# Patient Record
Sex: FEMALE | Race: WHITE | NOT HISPANIC OR LATINO | Employment: OTHER | URBAN - NONMETROPOLITAN AREA
[De-identification: names, ages, dates, MRNs, and addresses within clinical notes are randomized per-mention and may not be internally consistent; named-entity substitution may affect disease eponyms.]

---

## 2017-03-17 ENCOUNTER — TRANSCRIBE ORDERS (OUTPATIENT)
Dept: FAMILY MEDICINE CLINIC | Facility: CLINIC | Age: 67
End: 2017-03-17

## 2017-03-17 DIAGNOSIS — Z12.31 VISIT FOR SCREENING MAMMOGRAM: Primary | ICD-10-CM

## 2017-03-27 ENCOUNTER — HOSPITAL ENCOUNTER (OUTPATIENT)
Dept: MAMMOGRAPHY | Facility: HOSPITAL | Age: 67
Discharge: HOME | End: 2017-03-27
Admitting: INTERNAL MEDICINE

## 2017-03-27 DIAGNOSIS — Z12.31 VISIT FOR SCREENING MAMMOGRAM: ICD-10-CM

## 2017-03-27 PROCEDURE — G0202 SCR MAMMO BI INCL CAD: HCPCS | Performed by: RADIOLOGY

## 2017-03-27 PROCEDURE — G0202 SCR MAMMO BI INCL CAD: HCPCS

## 2017-03-27 PROCEDURE — 77063 BREAST TOMOSYNTHESIS BI: CPT

## 2017-03-27 PROCEDURE — 77063 BREAST TOMOSYNTHESIS BI: CPT | Performed by: RADIOLOGY

## 2017-03-31 ENCOUNTER — TRANSCRIBE ORDERS (OUTPATIENT)
Dept: GENERAL RADIOLOGY | Facility: HOSPITAL | Age: 67
End: 2017-03-31

## 2017-03-31 DIAGNOSIS — Z78.0 MENOPAUSE: Primary | ICD-10-CM

## 2017-04-05 ENCOUNTER — APPOINTMENT (OUTPATIENT)
Dept: BONE DENSITY | Facility: HOSPITAL | Age: 67
End: 2017-04-05

## 2017-04-05 DIAGNOSIS — Z78.0 MENOPAUSE: ICD-10-CM

## 2017-04-05 PROCEDURE — 77080 DXA BONE DENSITY AXIAL: CPT

## 2017-05-12 RX ORDER — MELOXICAM 7.5 MG/1
TABLET ORAL
Qty: 30 TABLET | Refills: 5 | OUTPATIENT
Start: 2017-05-12

## 2017-06-21 ENCOUNTER — CONSULT (OUTPATIENT)
Dept: CARDIOLOGY | Facility: CLINIC | Age: 67
End: 2017-06-21

## 2017-06-21 VITALS
SYSTOLIC BLOOD PRESSURE: 104 MMHG | WEIGHT: 214.8 LBS | BODY MASS INDEX: 35.79 KG/M2 | HEART RATE: 78 BPM | HEIGHT: 65 IN | DIASTOLIC BLOOD PRESSURE: 76 MMHG

## 2017-06-21 DIAGNOSIS — I47.1 ATRIAL TACHYCARDIA (HCC): Primary | ICD-10-CM

## 2017-06-21 PROBLEM — I47.19 ATRIAL TACHYCARDIA: Status: ACTIVE | Noted: 2017-06-21

## 2017-06-21 PROCEDURE — 93000 ELECTROCARDIOGRAM COMPLETE: CPT | Performed by: INTERNAL MEDICINE

## 2017-06-21 PROCEDURE — 99202 OFFICE O/P NEW SF 15 MIN: CPT | Performed by: INTERNAL MEDICINE

## 2017-06-21 NOTE — PROGRESS NOTES
Cardiology Consult     Tracy Barrios  1950  [phone]      06/21/17    DATE OF ADMISSION: (Not on file)  [facility] CARDIOLOGY    Zohra Gonzales MD  [address] [place] [id number]    Chief Complaint   Patient presents with   • Palpitations     Problem List:  1. SVT   A. Echocardiogram 2012: normal EF, mild MR/TR   B. EPS I RFA of atrial tachycardia, arising from the coronary sinus 3/29/13  2. HTN  3. HLP  4. DM  5. GERD  6. HH  7. Migraine.  8. Arthritis  9. Surgical History   A. Appendectomy   B. Breast reduction   C. Hysterectomy   D. Tonsillectomy   E. Bladder surgery      History of Present Illness:   66 year old WF who is referred to our care today by Dr. Gonzales for palpitations. She is a previous patient of Dr. Caruso and was last seen in 2013 after an atrial tachycardia ablation. She did well after the ablation for many years, however, about 4 months ago, she started experiencing palpitations. She thinks this was related to a muscle relaxer she was taking at that time for back pain. She described this as tachycardia with fatigue. She really just had one bad episode on a Sunday before [employer]. It subsided on its own after about 30 minutes. She would have to lay down. Now, she is off the medication and is not having the symptoms anymore. She does occasionally have palpitations if she climbs stairs that lasts about 5 minutes but this is not bothersome to her. She thinks she is doing fine now. She feels well overall, and denies CP, SOB, syncope, recent hospitalizations. No tobacco, no ETOH, no excessive caffeine.     Allergies   Allergen Reactions   • Metoprolol      Hair loss   • Statins      muscle pain   • Penicillins Rash        Cannot display prior to admission medications because the patient has not been admitted in this contact.            Current Outpatient Prescriptions:   •  baclofen (LIORESAL) 10 MG tablet, Take 1/2 - 1 tablet by mouth 3  (three) times a day as needed for spasm, Disp: 90 tablet, Rfl: 3  •  Cholecalciferol (VITAMIN D3) 5000 UNITS capsule capsule, Take 5,000 Units by mouth 1 (One) Time Per Week., Disp: , Rfl:   •  hydrochlorothiazide (MICROZIDE) 12.5 MG capsule, Take 1 capsule by mouth once daily, Disp: 90 capsule, Rfl: 1  •  lisinopril (PRINIVIL,ZESTRIL) 5 MG tablet, Take 1 tablet by mouth once daily for blood pressure, Disp: 90 tablet, Rfl: 1  •  meloxicam (MOBIC) 15 MG tablet, Take 1 tablet by mouth Every Morning. (Patient taking differently: Take 15 mg by mouth As Needed.), Disp: 30 tablet, Rfl: 3  •  metFORMIN (FORTAMET) 500 MG (OSM) 24 hr tablet, Take  1 tablet by mouth once daily, Disp: 30 tablet, Rfl: 5  •  pantoprazole (PROTONIX) 40 MG EC tablet, Take 1 tablet by mouth once daily (Patient taking differently: Take 20 mg by mouth.), Disp: 90 tablet, Rfl: 3    Social History     Social History   • Marital status:      Spouse name: N/A   • Number of children: N/A   • Years of education: N/A     Social History Main Topics   • Smoking status: Never Smoker   • Smokeless tobacco: Never Used   • Alcohol use No   • Drug use: No   • Sexual activity: Defer     Other Topics Concern   • None     Social History Narrative       Family History   Problem Relation Age of Onset   • Breast cancer Mother 50   • Cancer Mother      breast   • Hypertension Mother    • Heart attack Father    • Coronary artery disease Father    • Cancer Maternal Grandmother      Ovarian       REVIEW OF SYSTEMS:   CONST:  No weight loss, fever, chills, weakness or fatigue.   HEENT:  No visual loss, blurred vision, double vision, yellow sclerae.                   No hearing loss, congestion, sore throat.   SKIN:      No rashes, urticaria, ulcers, sores.     RESP:     No shortness of breath, hemoptysis, cough, sputum.   GI:           No anorexia, nausea, vomiting, diarrhea. No abdominal pain, melena.   :         No burning on urination, hematuria or increased  "frequency.  ENDO:    No diaphoresis, cold or heat intolerance. No polyuria or polydipsia.   NEURO:  No headache, dizziness, syncope, paralysis, ataxia, or parasthesias.                  No change in bowel or bladder control. No history of CVA/TIA  MUSC:    No muscle, back pain, joint pain or stiffness.   HEME:    No anemia, bleeding, bruising. No history of DVT/PE.  PSYCH:  No history of depression, anxiety    Vitals:    06/21/17 0901   BP: 104/76   BP Location: Right arm   Patient Position: Sitting   Pulse: 78   Weight: 214 lb 12.8 oz (97.4 kg)   Height: 65\" (165.1 cm)       Physical Exam:  GEN: Well nourished, Well- developed  No acute distress  HEENT: Normocephalic, Atraumatic, PERRLA, moist mucous membranes  NECK: supple, NO JVD, no thyromegaly, no lymphadenopathy  CARD: S1S2  RRR no murmur, gallop, rub  LUNGS: Clear to auscultataion, normal respiratory effort  ABDOMEN: Soft, nontender, normal bowel sounds  EXTREMITIES:No gross deformities,  No clubbing, cyanosis, or edema  SKIN: Warm, dry  NEURO: No focal deficits  PSYCHIATRIC: Normal affect and mood      Data:                                          ECG 12 Lead  Date/Time: 6/21/2017 9:36 AM  Performed by: LUBNA CARUSO  Authorized by: LUBNA CARUSO   Rhythm: sinus rhythm  BPM: 78                Assessment and Plan:   1. Atrial Tachycardia s/p ablation 4 years ago, doing well. She recently had some palpitations seem to be triggered by a medication. Now resolved. No further work up needed at this time. We will be happy to see her anytime she has problems    Follow up prn    Scribed for Lubna Caruso MD by Davina Churchill PA-C. 6/21/2017  9:37 AM     ILubna MD, personally performed the services face to face as described in this documentation and as scribed by the above named individual in my presence, and it is both accurate and complete.  6/21/2017  9:37 AM              "

## 2018-08-30 ENCOUNTER — TRANSCRIBE ORDERS (OUTPATIENT)
Dept: ADMINISTRATIVE | Facility: HOSPITAL | Age: 68
End: 2018-08-30

## 2018-08-30 DIAGNOSIS — Z12.31 VISIT FOR SCREENING MAMMOGRAM: Primary | ICD-10-CM

## 2018-10-03 ENCOUNTER — HOSPITAL ENCOUNTER (OUTPATIENT)
Dept: MAMMOGRAPHY | Facility: HOSPITAL | Age: 68
Discharge: HOME | End: 2018-10-03
Admitting: INTERNAL MEDICINE

## 2018-10-03 DIAGNOSIS — Z12.31 VISIT FOR SCREENING MAMMOGRAM: ICD-10-CM

## 2018-10-03 PROCEDURE — 77067 SCR MAMMO BI INCL CAD: CPT

## 2018-10-03 PROCEDURE — 77063 BREAST TOMOSYNTHESIS BI: CPT

## 2018-10-03 PROCEDURE — 77067 SCR MAMMO BI INCL CAD: CPT | Performed by: RADIOLOGY

## 2018-10-03 PROCEDURE — 77063 BREAST TOMOSYNTHESIS BI: CPT | Performed by: RADIOLOGY

## 2020-08-13 ENCOUNTER — OFFICE VISIT (OUTPATIENT)
Dept: ORTHOPEDIC SURGERY | Facility: CLINIC | Age: 70
End: 2020-08-13

## 2020-08-13 VITALS — WEIGHT: 214 LBS | HEIGHT: 65 IN | RESPIRATION RATE: 18 BRPM | BODY MASS INDEX: 35.65 KG/M2

## 2020-08-13 DIAGNOSIS — M23.91 INTERNAL DERANGEMENT OF KNEE JOINT, RIGHT: ICD-10-CM

## 2020-08-13 DIAGNOSIS — M17.10 ARTHRITIS OF KNEE: ICD-10-CM

## 2020-08-13 DIAGNOSIS — M25.562 ARTHRALGIA OF BOTH KNEES: Primary | ICD-10-CM

## 2020-08-13 DIAGNOSIS — M25.561 ARTHRALGIA OF BOTH KNEES: Primary | ICD-10-CM

## 2020-08-13 PROCEDURE — 99204 OFFICE O/P NEW MOD 45 MIN: CPT | Performed by: ORTHOPAEDIC SURGERY

## 2020-08-13 NOTE — PROGRESS NOTES
Subjective   Patient ID: Tracy Barrios is a 70 y.o. female  Pain of the Left Knee (Patient is here today for bilateral knee pain, she states her right knee is more painful than the left. Patient denies any injury or trauma. About a month ago she was camping and climbing in and out of the RV the knee became painful.) and Pain of the Right Knee             History of Present Illness  70-year-old female with history of chronic osteoarthritis of her left knee had more than 4 to 6 weeks of pain and cramping in her right knee after climbing another RV while camping.  Feels tight swollen stiff painful to bear weight most the pain is lateral posteriorly, she is a relative of told her she probably has a torn meniscus.  She saw her PCP by video conference and was referred here for further evaluation.  Denies hip pain back pain paresthesias swelling in the ankle or other neurovascular complaints.  Gives no history of prior injections or surgery to the right knee.  Has managed her left knee arthritis with meloxicam for years and is under control.  She was treated with a short course of prednisone p.o. for her right knee pain which helped very little.      Review of Systems   Constitutional: Negative for fever.   HENT: Negative for dental problem and voice change.    Eyes: Negative for visual disturbance.   Respiratory: Negative for shortness of breath.    Cardiovascular: Negative for chest pain.   Gastrointestinal: Negative for abdominal pain.   Genitourinary: Negative for dysuria.   Musculoskeletal: Positive for arthralgias. Negative for gait problem and joint swelling.   Skin: Negative for rash.   Neurological: Negative for speech difficulty.   Hematological: Does not bruise/bleed easily.   Psychiatric/Behavioral: Negative for confusion.       Past Medical History:   Diagnosis Date   • Arrhythmia    • Arthritis    • Atrial fibrillation (CMS/HCC)    • Diabetes mellitus (CMS/HCC)    • Dyslipidemia    • GERD (gastroesophageal  reflux disease)     GERD/ Hiatal Hernia   • Hyperlipidemia    • Hypertension    • Migraine     Migraine headaches   • SVT (supraventricular tachycardia) (CMS/HCC)         Past Surgical History:   Procedure Laterality Date   • ABLATION OF DYSRHYTHMIC FOCUS     • APPENDECTOMY     • BLADDER SURGERY     • BREAST BIOPSY Right 1990    Ultrasound guided   • CARDIAC CATHETERIZATION     • HYSTERECTOMY  1991    Total w/BSO   • OOPHORECTOMY Bilateral 1991   • REDUCTION MAMMAPLASTY Bilateral 1992   • TONSILLECTOMY         Family History   Problem Relation Age of Onset   • Breast cancer Mother 50   • Cancer Mother         breast   • Hypertension Mother    • Heart attack Father    • Coronary artery disease Father    • Cancer Maternal Grandmother         Ovarian       Social History     Socioeconomic History   • Marital status:      Spouse name: Not on file   • Number of children: Not on file   • Years of education: Not on file   • Highest education level: Not on file   Tobacco Use   • Smoking status: Never Smoker   • Smokeless tobacco: Never Used   Substance and Sexual Activity   • Alcohol use: No   • Drug use: No   • Sexual activity: Defer       I have reviewed the medical and surgical history, family history, social history, medications, and/or allergies, and the review of systems of this report.    Allergies   Allergen Reactions   • Metoprolol      Hair loss   • Statins      muscle pain   • Levofloxacin Rash   • Penicillins Rash         Current Outpatient Medications:   •  amoxicillin (AMOXIL) 500 MG capsule, Take 1 capsule by mouth 3 times daily until gone, Disp: 30 capsule, Rfl: 0  •  azithromycin (ZITHROMAX) 250 MG tablet, Take 2 tablets the first day, then 1 tablet daily for 4 days., Disp: 6 tablet, Rfl: 0  •  baclofen (LIORESAL) 10 MG tablet, Take 1/2 - 1 tablet by mouth 3 (three) times a day as needed for spasm, Disp: 90 tablet, Rfl: 3  •  baclofen (LIORESAL) 10 MG tablet, Take 1/2 to 1 tablet by mouth 3 (three)  times a day as needed for spasms, Disp: 90 tablet, Rfl: 3  •  baclofen (LIORESAL) 10 MG tablet, Take 1/2 to 1 tablet by mouth 3 times daily as needed for spasms, Disp: 90 tablet, Rfl: 3  •  baclofen (LIORESAL) 10 MG tablet, Take 1 tablet by mouth 3 (Three) Times a Day As Needed for muscle spasm, Disp: 90 tablet, Rfl: 3  •  benzonatate (TESSALON) 200 MG capsule, Take 1 capsule by mouth 3 (Three) Times a Day for 5 days, Disp: 15 capsule, Rfl: 0  •  Cholecalciferol (VITAMIN D3) 5000 UNITS capsule capsule, Take 5,000 Units by mouth 1 (One) Time Per Week., Disp: , Rfl:   •  clarithromycin (BIAXIN) 500 MG tablet, Take 1 tablet by mouth Every 12 (Twelve) Hours., Disp: 20 tablet, Rfl: 0  •  clindamycin (CLEOCIN) 300 MG capsule, Take 1 capsule by mouth Every 8 (Eight) Hours fo 10 days, Disp: 30 capsule, Rfl: 0  •  colesevelam (WELCHOL) 625 MG tablet, Take 1 tablet by mouth twice daily, try to gradually work up to 3 tablets twice daily, increasing dose weekly by 1 pill, Disp: 180 tablet, Rfl: 6  •  fenofibrate 160 MG tablet, Take one tablet by mouth at bedtime, Disp: 90 tablet, Rfl: 1  •  fenofibrate 160 MG tablet, TAKE 1 TABLET BY MOUTH ONCE A DAY FOR HIGH TRIGLYCERIDES, Disp: 30 tablet, Rfl: 6  •  gabapentin (NEURONTIN) 100 MG capsule, Take 1 capsule by mouth daily at bedtime with food, may increase after 3 days to 2 capsules at bedtime, Disp: 60 capsule, Rfl: 3  •  gabapentin (NEURONTIN) 300 MG capsule, Take 2 capsules by mouth every night at bedtime., Disp: 60 capsule, Rfl: 2  •  hydrochlorothiazide (MICROZIDE) 12.5 MG capsule, Take 1 capsule by mouth once daily, Disp: 90 capsule, Rfl: 1  •  hydrochlorothiazide (MICROZIDE) 12.5 MG capsule, Take 1 capsule by mouth once daily, Disp: 90 capsule, Rfl: 1  •  hydrochlorothiazide (MICROZIDE) 12.5 MG capsule, Take 1 capsule by mouth once daily, Disp: 90 capsule, Rfl: 1  •  hydrochlorothiazide (MICROZIDE) 12.5 MG capsule, Take 1 capsule by mouth once daily, Disp: 90 capsule, Rfl:  3  •  hydroCHLOROthiazide (MICROZIDE) 12.5 MG capsule, Take 1 capsule by mouth Daily., Disp: 90 capsule, Rfl: 3  •  hydroCHLOROthiazide (MICROZIDE) 12.5 MG capsule, Take 1 capsule by mouth Daily., Disp: 90 capsule, Rfl: 3  •  lisinopril (PRINIVIL,ZESTRIL) 2.5 MG tablet, Take 1 tablet by mouth Daily for blood pressure, Disp: 30 tablet, Rfl: 6  •  lisinopril (PRINIVIL,ZESTRIL) 2.5 MG tablet, TAKE 1 TABLET BY MOUTH EVERY DAY FOR BLOOD PRESSURE, Disp: 90 tablet, Rfl: 3  •  lisinopril (PRINIVIL,ZESTRIL) 2.5 MG tablet, Take 1 tablet by mouth Daily for blood pressure, Disp: 90 tablet, Rfl: 3  •  lisinopril (PRINIVIL,ZESTRIL) 2.5 MG tablet, Take 1 tablet by mouth Daily for blood pressure, Disp: 90 tablet, Rfl: 3  •  lisinopril (PRINIVIL,ZESTRIL) 5 MG tablet, Take 1 tablet by mouth once daily for blood pressure, Disp: 90 tablet, Rfl: 1  •  meloxicam (MOBIC) 15 MG tablet, Take 1 tablet by mouth Every Morning. (Patient taking differently: Take 15 mg by mouth As Needed.), Disp: 30 tablet, Rfl: 3  •  meloxicam (MOBIC) 15 MG tablet, Take 1 tablet by mouth Daily, Disp: 30 tablet, Rfl: 5  •  meloxicam (MOBIC) 15 MG tablet, Take 1 tablet by mouth once daily, Disp: 30 tablet, Rfl: 5  •  meloxicam (MOBIC) 15 MG tablet, Take 1 tablet by mouth Daily as needed, Disp: 90 tablet, Rfl: 2  •  meloxicam (MOBIC) 7.5 MG tablet, Take 1 tablet by mouth Daily., Disp: 90 tablet, Rfl: 3  •  meloxicam (MOBIC) 7.5 MG tablet, Take 1 tablet by mouth 2 (Two) Times a Day As Needed., Disp: 180 tablet, Rfl: 3  •  metFORMIN (FORTAMET) 500 MG (OSM) 24 hr tablet, Take  1 tablet by mouth once daily, Disp: 30 tablet, Rfl: 5  •  metFORMIN ER (GLUCOPHAGE-XR) 500 MG 24 hr tablet, Take 1 tablet by mouth once daily, Disp: 30 tablet, Rfl: 5  •  metFORMIN ER (GLUCOPHAGE-XR) 500 MG 24 hr tablet, Take 1 tablet by mouth Daily., Disp: 90 tablet, Rfl: 3  •  methylPREDNISolone (MEDROL, HERBER,) 4 MG tablet, Take as directed per package instructions, Disp: 21 tablet, Rfl: 0  •   "methylPREDNISolone (MEDROL, HERBER,) 4 MG tablet, Take as directed on package, Disp: 21 tablet, Rfl: 0  •  mupirocin (BACTROBAN) 2 % ointment, Apply topically to affected area twice a day in place of vaseline with wound care., Disp: 22 g, Rfl: 0  •  nitrofurantoin, macrocrystal-monohydrate, (MACROBID) 100 MG capsule, Take 1 capsule by mouth twice daily for UTI, Disp: 14 capsule, Rfl: 0  •  oseltamivir (TAMIFLU) 75 MG capsule, Take 1 capsule by mouth 2 (Two) Times a Day., Disp: 10 capsule, Rfl: 0  •  oseltamivir (TAMIFLU) 75 MG capsule, Take 1 capsule by mouth Daily., Disp: 10 capsule, Rfl: 0  •  pantoprazole (PROTONIX) 40 MG EC tablet, Take 1 tablet by mouth once daily (Patient taking differently: Take 20 mg by mouth.), Disp: 90 tablet, Rfl: 3  •  pantoprazole (PROTONIX) 40 MG EC tablet, Take 1 tablet by mouth Daily., Disp: 90 tablet, Rfl: 3  •  predniSONE (DELTASONE) 10 MG tablet, Take 2 tabs twice a day for 3 days,  2 tabs in the morning and 1 in the evening for 3 days,  1 tab 2 times a day for 3 day, then 1 tab daily, Disp: 30 tablet, Rfl: 0  •  predniSONE (DELTASONE) 20 MG tablet, TAKE 2 TABLETS BY MOUTH ONCE A DAY FOR 5 DAYS, Disp: 10 tablet, Rfl: 0    Objective   Resp 18   Ht 165.1 cm (65\")   Wt 97.1 kg (214 lb)   LMP  (LMP Unknown)   BMI 35.61 kg/m²    Physical Exam  Constitutional: Patient is oriented to person, place, and time. Patient appears well-developed and well-nourished.   HENT:Head: Normocephalic and atraumatic.   Eyes: EOM are normal. Pupils are equal, round, and reactive to light.   Neck: Normal range of motion. Neck supple.   Cardiovascular: Normal rate.    Pulmonary/Chest: Effort normal and breath sounds normal.   Abdominal: Soft.   Neurological: Patient is alert and oriented to person, place, and time.   Skin: Skin is warm and dry.   Psychiatric: Patient has a normal mood and affect.   Nursing note and vitals reviewed.       [unfilled]   Right knee: 2+ effusion loss of extension 10 degrees " positive Gerardo's finding with reproduction of posterior lateral joint line pain positive medial and lateral joint line pain to palpation minimal patellofemoral crepitus no tenderness at the quadriceps or patellar tendon region calf supple neurovascularly intact    Assessment/Plan   Review of Radiographic Studies:    Indication to evaluate joint condition, no comparison views available, shows evident chronic advanced osteoarthritis.      Procedures     Tracy was seen today for pain and pain.    Diagnoses and all orders for this visit:    Arthralgia of both knees  -     XR Knee 3 View Bilateral; Future       Orthopedic activities reviewed and patient expressed appreciation, Risk, benefits, and merits of treatment alternatives reviewed with the patient and questions answered, The nature of the proposed surgery reviewed with the patient including risks, benefits, rehabilitation, recovery timeframe, and outcome expectations and Using illustrations and models, the nature of the pathology was explained to the patient      Recommendations/Plan:   Work/Activity Status: May perform usual activities as tolerated    Patient agreeable to call or return sooner for any concerns.         Reviewed and discussed with her the nature of her condition should MRI confirmed degenerative tearing arthroscopic treatment will be offered.    Impression:  Chronic minimally symptomatic left knee osteoarthritis, right knee loss of motion swelling mechanical symptoms probable degenerative meniscal tear  Plan:  MR right knee continue meloxicam avoid crawling bending squatting

## 2020-08-19 DIAGNOSIS — M17.10 ARTHRITIS OF KNEE: ICD-10-CM

## 2020-08-19 DIAGNOSIS — M23.91 INTERNAL DERANGEMENT OF KNEE JOINT, RIGHT: Primary | ICD-10-CM

## 2020-09-11 ENCOUNTER — HOSPITAL ENCOUNTER (OUTPATIENT)
Dept: MRI IMAGING | Facility: HOSPITAL | Age: 70
Discharge: HOME | End: 2020-09-11
Admitting: ORTHOPAEDIC SURGERY

## 2020-09-11 PROCEDURE — 73721 MRI JNT OF LWR EXTRE W/O DYE: CPT

## 2020-09-22 ENCOUNTER — OFFICE VISIT (OUTPATIENT)
Dept: ORTHOPEDIC SURGERY | Facility: CLINIC | Age: 70
End: 2020-09-22

## 2020-09-22 DIAGNOSIS — M17.10 ARTHRITIS OF KNEE: Primary | ICD-10-CM

## 2020-09-22 NOTE — PROGRESS NOTES
You have chosen to receive care through a telephone visit. Do you consent to use a telephone visit for your medical care today? Yes  Telephone conversation with her today regarding her recent MRI right knee.  Her symptoms have improved slightly since her last visit, she still does get pain swelling tight feeling and more the pain will focus is on the lateral side of the joint line.  Feels different than her left knee which is chronically arthritic.  She does have grandchildren and is on her feet constantly.  Is planning a trip to [place] like to postpone any idea of surgery until she returns from [place].  I did review the results of her degenerative meniscus at the lateral compartment of her knee associate with arthritis explained her the indications for surgical treatment rehab pros cons risk and complications of each.  She will call for revisit with me in 3 weeks when she returns from [place] for reexam and at that point if she still has mechanical symptoms we will discuss arthroscopic debridement

## 2020-10-27 ENCOUNTER — PREP FOR SURGERY (OUTPATIENT)
Dept: OTHER | Facility: HOSPITAL | Age: 70
End: 2020-10-27

## 2020-10-27 ENCOUNTER — OFFICE VISIT (OUTPATIENT)
Dept: ORTHOPEDIC SURGERY | Facility: CLINIC | Age: 70
End: 2020-10-27

## 2020-10-27 VITALS — HEIGHT: 65 IN | WEIGHT: 214 LBS | BODY MASS INDEX: 35.65 KG/M2 | RESPIRATION RATE: 18 BRPM

## 2020-10-27 DIAGNOSIS — S83.271D COMPLEX TEAR OF LATERAL MENISCUS OF RIGHT KNEE AS CURRENT INJURY, SUBSEQUENT ENCOUNTER: ICD-10-CM

## 2020-10-27 DIAGNOSIS — Z01.818 PREOP EXAMINATION: Primary | ICD-10-CM

## 2020-10-27 DIAGNOSIS — M17.10 ARTHRITIS OF KNEE: Primary | ICD-10-CM

## 2020-10-27 PROCEDURE — 99214 OFFICE O/P EST MOD 30 MIN: CPT | Performed by: ORTHOPAEDIC SURGERY

## 2020-10-27 RX ORDER — CLINDAMYCIN PHOSPHATE 900 MG/50ML
900 INJECTION, SOLUTION INTRAVENOUS
Status: CANCELLED | OUTPATIENT
Start: 2020-10-28 | End: 2020-10-29

## 2020-10-27 NOTE — PROGRESS NOTES
Subjective   Patient ID: Tracy Barrios is a 70 y.o. female  Follow-up and Pain of the Right Knee (Patient is here today to discuss surgery.)             History of Present Illness    70-year-old female complains of right knee pain swelling loss of motion MRIs confirmed positive anterolateral meniscal tear with a large Baker's cyst due to persistence of pain she would like to discuss treatment alternatives and further review the nature of surgical treatment for her right knee.  She recently returned from a airline flight to and from [place] and developed increased pain tightness swelling especially when first arising at the right knee after her trip.     Her medical history is positive for cardiac ablation procedures that had to be done after she says she had an allergic reaction to an antibiotic, has had no further cardiac issues since that time.        Review of Systems   Constitutional: Negative for fever.   HENT: Negative for dental problem and voice change.    Eyes: Negative for visual disturbance.   Respiratory: Negative for shortness of breath.    Cardiovascular: Negative for chest pain.   Gastrointestinal: Negative for abdominal pain.   Genitourinary: Negative for dysuria.   Musculoskeletal: Positive for arthralgias. Negative for gait problem and joint swelling.   Skin: Negative for rash.   Neurological: Negative for speech difficulty.   Hematological: Does not bruise/bleed easily.   Psychiatric/Behavioral: Negative for confusion.       Past Medical History:   Diagnosis Date   • Arrhythmia    • Arthritis    • Atrial fibrillation (CMS/HCC)    • Diabetes mellitus (CMS/HCC)    • Dyslipidemia    • GERD (gastroesophageal reflux disease)     GERD/ Hiatal Hernia   • Hyperlipidemia    • Hypertension    • Migraine     Migraine headaches   • SVT (supraventricular tachycardia) (CMS/HCC)         Past Surgical History:   Procedure Laterality Date   • ABLATION OF DYSRHYTHMIC FOCUS     • APPENDECTOMY     • BLADDER SURGERY      • BREAST BIOPSY Right 1990    Ultrasound guided   • CARDIAC CATHETERIZATION     • HYSTERECTOMY  1991    Total w/BSO   • OOPHORECTOMY Bilateral 1991   • REDUCTION MAMMAPLASTY Bilateral 1992   • TONSILLECTOMY         Family History   Problem Relation Age of Onset   • Breast cancer Mother 50   • Cancer Mother         breast   • Hypertension Mother    • Heart attack Father    • Coronary artery disease Father    • Cancer Maternal Grandmother         Ovarian       Social History     Socioeconomic History   • Marital status:      Spouse name: Not on file   • Number of children: Not on file   • Years of education: Not on file   • Highest education level: Not on file   Tobacco Use   • Smoking status: Never Smoker   • Smokeless tobacco: Never Used   Substance and Sexual Activity   • Alcohol use: No   • Drug use: No   • Sexual activity: Defer       I have reviewed the medical and surgical history, family history, social history, medications, and/or allergies, and the review of systems of this report.    Allergies   Allergen Reactions   • Metoprolol      Hair loss   • Statins      muscle pain   • Levofloxacin Rash   • Penicillins Rash         Current Outpatient Medications:   •  Cholecalciferol (VITAMIN D3) 5000 UNITS capsule capsule, Take 5,000 Units by mouth 1 (One) Time Per Week., Disp: , Rfl:   •  lisinopril (PRINIVIL,ZESTRIL) 2.5 MG tablet, Take 1 tablet by mouth Daily for blood pressure, Disp: 30 tablet, Rfl: 6  •  lisinopril (PRINIVIL,ZESTRIL) 2.5 MG tablet, TAKE 1 TABLET BY MOUTH EVERY DAY FOR BLOOD PRESSURE, Disp: 90 tablet, Rfl: 3  •  lisinopril (PRINIVIL,ZESTRIL) 2.5 MG tablet, Take 1 tablet by mouth Daily for blood pressure, Disp: 90 tablet, Rfl: 3  •  lisinopril (PRINIVIL,ZESTRIL) 2.5 MG tablet, Take 1 tablet by mouth Daily for blood pressure, Disp: 90 tablet, Rfl: 3  •  meloxicam (MOBIC) 15 MG tablet, Take 1 tablet by mouth once daily, Disp: 30 tablet, Rfl: 5  •  meloxicam (MOBIC) 7.5 MG tablet, Take 1  "tablet by mouth Daily., Disp: 90 tablet, Rfl: 3  •  meloxicam (MOBIC) 7.5 MG tablet, Take 1 tablet by mouth 2 (Two) Times a Day As Needed., Disp: 180 tablet, Rfl: 3  •  metFORMIN ER (GLUCOPHAGE-XR) 500 MG 24 hr tablet, Take 1 tablet by mouth once daily, Disp: 30 tablet, Rfl: 5  •  nitrofurantoin, macrocrystal-monohydrate, (MACROBID) 100 MG capsule, Take 1 capsule by mouth twice daily for UTI, Disp: 14 capsule, Rfl: 0  •  pantoprazole (PROTONIX) 40 MG EC tablet, Take 1 tablet by mouth once daily (Patient taking differently: Take 20 mg by mouth.), Disp: 90 tablet, Rfl: 3  •  pantoprazole (PROTONIX) 40 MG EC tablet, Take 1 tablet by mouth Daily., Disp: 90 tablet, Rfl: 3  •  predniSONE (DELTASONE) 10 MG tablet, Take 2 tabs twice a day for 3 days,  2 tabs in the morning and 1 in the evening for 3 days,  1 tab 2 times a day for 3 day, then 1 tab daily, Disp: 30 tablet, Rfl: 0  •  predniSONE (DELTASONE) 20 MG tablet, TAKE 2 TABLETS BY MOUTH ONCE A DAY FOR 5 DAYS, Disp: 10 tablet, Rfl: 0    Objective   Resp 18   Ht 165.1 cm (65\")   Wt 97.1 kg (214 lb)   LMP  (LMP Unknown)   BMI 35.61 kg/m²    Physical Exam  Constitutional: Patient is oriented to person, place, and time. Patient appears well-developed and well-nourished.   HENT:Head: Normocephalic and atraumatic.   Eyes: EOM are normal. Pupils are equal, round, and reactive to light.   Neck: Normal range of motion. Neck supple.   Cardiovascular: Normal rate.    Pulmonary/Chest: Effort normal and breath sounds normal.   Abdominal: Soft.   Neurological: Patient is alert and oriented to person, place, and time.   Skin: Skin is warm and dry.   Psychiatric: Patient has a normal mood and affect.   Nursing note and vitals reviewed.       [unfilled]   Right knee: 3+ effusion positive anterolateral tenderness positive Gerardo sign with reproduction of lateral and medial joint line pain, palpable posterior Baker's cyst, calf without tenderness swelling edema, pain is present " posteriorly at the knee with extension, negative straight leg raising, no significant atrophy.    Assessment/Plan   Review of Radiographic Studies:    No new imaging done today.  MRI with evident meniscal tear noted.      Procedures     Diagnoses and all orders for this visit:    1. Arthritis of knee (Primary)    2. Complex tear of lateral meniscus of right knee as current injury, subsequent encounter       Orthopedic activities reviewed and patient expressed appreciation, Risk, benefits, and merits of treatment alternatives reviewed with the patient and questions answered, The nature of the proposed surgery reviewed with the patient including risks, benefits, rehabilitation, recovery timeframe, and outcome expectations and Using illustrations and models, the nature of the pathology was explained to the patient      Recommendations/Plan:   Surgery: Surgery proposed at this visit as noted.    Patient agreeable to call or return sooner for any concerns.         I discussed with the patient the diagnosis, condition, natural history and treatment alternatives, both surgical and nonsurgical.  I reviewed the surgical procedural details using models, diagrams and reviewing x-ray findings.  I explained the nature of the operation, anesthesia methods and the postoperative recovery.  I explained risks and complications including but not limited to infection, bleeding, blood clotting, poor healing, chronic pain, stiffness, failure of the procedure, possible recurrence of the condition and anesthetic related risks.  The patient had opportunity to ask questions which were all answered to their satisfaction and decided to proceed with the plan for surgery.  I believe informed consent to proceed with the surgery was given verbally in my presence today.  The surgical consent form will be signed in the presence of the nursing staff prior to the surgery.    She is concerned about receiving a general anesthetic as her mother age 80  developed Alzheimer's disease after 3 knee procedures, I did advise the patient to discuss her concerns with anesthesia and we will attempt to do conscious sedation with local anesthetic but as a routine matter anesthesia should be prepared to offer true general anesthetic if necessary to protect her airway during the procedure.  She is willing to proceed understands and her questions were answered to her satisfaction    Impression:    Right knee pain swelling loss of motion positive lateral meniscal tear on MRI question medial meniscal tear and chondromalacia patella  Plan:  Right knee diagnostic arthroscopy partial lateral meniscectomy or other procedures as indicated  Preop testing to consist of CBC BMP EKG  Postoperatively she will need assistance with caring for her 2 elderly parents for at least a week or 2 after surgery, plan to use Norco in addition to NSAIDs and Tylenol

## 2020-10-28 ENCOUNTER — APPOINTMENT (OUTPATIENT)
Dept: PREADMISSION TESTING | Facility: HOSPITAL | Age: 70
End: 2020-10-28

## 2020-10-28 VITALS — BODY MASS INDEX: 36.72 KG/M2 | HEIGHT: 65 IN | WEIGHT: 220.4 LBS

## 2020-10-28 DIAGNOSIS — Z01.818 PREOP EXAMINATION: ICD-10-CM

## 2020-10-28 LAB
ANION GAP SERPL CALCULATED.3IONS-SCNC: 10.5 MMOL/L (ref 5–15)
BASOPHILS # BLD AUTO: 0.08 10*3/MM3 (ref 0–0.2)
BASOPHILS NFR BLD AUTO: 0.8 % (ref 0–1.5)
BUN SERPL-MCNC: 19 MG/DL (ref 8–23)
BUN/CREAT SERPL: 24.7 (ref 7–25)
CALCIUM SPEC-SCNC: 9.8 MG/DL (ref 8.6–10.5)
CHLORIDE SERPL-SCNC: 104 MMOL/L (ref 98–107)
CO2 SERPL-SCNC: 26.5 MMOL/L (ref 22–29)
CREAT SERPL-MCNC: 0.77 MG/DL (ref 0.57–1)
DEPRECATED RDW RBC AUTO: 46.6 FL (ref 37–54)
EOSINOPHIL # BLD AUTO: 0.22 10*3/MM3 (ref 0–0.4)
EOSINOPHIL NFR BLD AUTO: 2.3 % (ref 0.3–6.2)
ERYTHROCYTE [DISTWIDTH] IN BLOOD BY AUTOMATED COUNT: 13.6 % (ref 12.3–15.4)
GFR SERPL CREATININE-BSD FRML MDRD: 74 ML/MIN/1.73
GLUCOSE SERPL-MCNC: 116 MG/DL (ref 65–99)
HCT VFR BLD AUTO: 43.7 % (ref 34–46.6)
HGB BLD-MCNC: 14.3 G/DL (ref 12–15.9)
IMM GRANULOCYTES # BLD AUTO: 0.03 10*3/MM3 (ref 0–0.05)
IMM GRANULOCYTES NFR BLD AUTO: 0.3 % (ref 0–0.5)
LYMPHOCYTES # BLD AUTO: 2.48 10*3/MM3 (ref 0.7–3.1)
LYMPHOCYTES NFR BLD AUTO: 25.8 % (ref 19.6–45.3)
MCH RBC QN AUTO: 30.2 PG (ref 26.6–33)
MCHC RBC AUTO-ENTMCNC: 32.7 G/DL (ref 31.5–35.7)
MCV RBC AUTO: 92.2 FL (ref 79–97)
MONOCYTES # BLD AUTO: 0.49 10*3/MM3 (ref 0.1–0.9)
MONOCYTES NFR BLD AUTO: 5.1 % (ref 5–12)
NEUTROPHILS NFR BLD AUTO: 6.32 10*3/MM3 (ref 1.7–7)
NEUTROPHILS NFR BLD AUTO: 65.7 % (ref 42.7–76)
NRBC BLD AUTO-RTO: 0 /100 WBC (ref 0–0.2)
PLATELET # BLD AUTO: 324 10*3/MM3 (ref 140–450)
PMV BLD AUTO: 9 FL (ref 6–12)
POTASSIUM SERPL-SCNC: 3.9 MMOL/L (ref 3.5–5.2)
RBC # BLD AUTO: 4.74 10*6/MM3 (ref 3.77–5.28)
SODIUM SERPL-SCNC: 141 MMOL/L (ref 136–145)
WBC # BLD AUTO: 9.62 10*3/MM3 (ref 3.4–10.8)

## 2020-10-28 PROCEDURE — 80048 BASIC METABOLIC PNL TOTAL CA: CPT

## 2020-10-28 PROCEDURE — C9803 HOPD COVID-19 SPEC COLLECT: HCPCS

## 2020-10-28 PROCEDURE — 85025 COMPLETE CBC W/AUTO DIFF WBC: CPT

## 2020-10-28 PROCEDURE — U0004 COV-19 TEST NON-CDC HGH THRU: HCPCS

## 2020-10-28 PROCEDURE — 36415 COLL VENOUS BLD VENIPUNCTURE: CPT

## 2020-10-28 PROCEDURE — 93005 ELECTROCARDIOGRAM TRACING: CPT

## 2020-10-28 RX ORDER — LISINOPRIL 2.5 MG/1
2.5 TABLET ORAL DAILY
COMMUNITY
End: 2020-10-30 | Stop reason: HOSPADM

## 2020-10-28 RX ORDER — ASCORBIC ACID 500 MG
500 TABLET ORAL DAILY
COMMUNITY
End: 2021-08-05

## 2020-10-29 LAB
QT INTERVAL: 362 MS
QTC INTERVAL: 409 MS
SARS-COV-2 RNA RESP QL NAA+PROBE: NOT DETECTED

## 2020-10-30 ENCOUNTER — ANESTHESIA EVENT (OUTPATIENT)
Dept: PERIOP | Facility: HOSPITAL | Age: 70
End: 2020-10-30

## 2020-10-30 ENCOUNTER — HOSPITAL ENCOUNTER (OUTPATIENT)
Facility: HOSPITAL | Age: 70
Setting detail: HOSPITAL OUTPATIENT SURGERY
Discharge: HOME | End: 2020-10-30
Attending: ORTHOPAEDIC SURGERY | Admitting: ORTHOPAEDIC SURGERY

## 2020-10-30 ENCOUNTER — ANESTHESIA (OUTPATIENT)
Dept: PERIOP | Facility: HOSPITAL | Age: 70
End: 2020-10-30

## 2020-10-30 VITALS
RESPIRATION RATE: 20 BRPM | OXYGEN SATURATION: 100 % | SYSTOLIC BLOOD PRESSURE: 153 MMHG | HEART RATE: 87 BPM | TEMPERATURE: 98.3 F | DIASTOLIC BLOOD PRESSURE: 71 MMHG

## 2020-10-30 DIAGNOSIS — Z01.818 PREOP EXAMINATION: ICD-10-CM

## 2020-10-30 LAB
GLUCOSE BLDC GLUCOMTR-MCNC: 85 MG/DL (ref 70–130)
GLUCOSE BLDC GLUCOMTR-MCNC: 99 MG/DL (ref 70–130)

## 2020-10-30 PROCEDURE — 25010000002 DEXAMETHASONE PER 1 MG: Performed by: NURSE ANESTHETIST, CERTIFIED REGISTERED

## 2020-10-30 PROCEDURE — 82962 GLUCOSE BLOOD TEST: CPT

## 2020-10-30 PROCEDURE — 25010000002 KETOROLAC TROMETHAMINE PER 15 MG: Performed by: NURSE ANESTHETIST, CERTIFIED REGISTERED

## 2020-10-30 PROCEDURE — 25010000002 EPINEPHRINE PER 0.1 MG: Performed by: ORTHOPAEDIC SURGERY

## 2020-10-30 PROCEDURE — 29881 ARTHRS KNE SRG MNISECTMY M/L: CPT | Performed by: ORTHOPAEDIC SURGERY

## 2020-10-30 PROCEDURE — 25010000002 ONDANSETRON PER 1 MG: Performed by: NURSE ANESTHETIST, CERTIFIED REGISTERED

## 2020-10-30 RX ORDER — SODIUM CHLORIDE 0.9 % (FLUSH) 0.9 %
10 SYRINGE (ML) INJECTION AS NEEDED
Status: DISCONTINUED | OUTPATIENT
Start: 2020-10-30 | End: 2020-10-30 | Stop reason: HOSPADM

## 2020-10-30 RX ORDER — HYDROCODONE BITARTRATE AND ACETAMINOPHEN 5; 325 MG/1; MG/1
1 TABLET ORAL EVERY 6 HOURS PRN
Qty: 6 TABLET | Refills: 0 | Status: SHIPPED | OUTPATIENT
Start: 2020-10-30 | End: 2021-03-02

## 2020-10-30 RX ORDER — KETOROLAC TROMETHAMINE 30 MG/ML
INJECTION, SOLUTION INTRAMUSCULAR; INTRAVENOUS AS NEEDED
Status: DISCONTINUED | OUTPATIENT
Start: 2020-10-30 | End: 2020-10-30 | Stop reason: SURG

## 2020-10-30 RX ORDER — DEXAMETHASONE SODIUM PHOSPHATE 4 MG/ML
INJECTION, SOLUTION INTRA-ARTICULAR; INTRALESIONAL; INTRAMUSCULAR; INTRAVENOUS; SOFT TISSUE AS NEEDED
Status: DISCONTINUED | OUTPATIENT
Start: 2020-10-30 | End: 2020-10-30 | Stop reason: SURG

## 2020-10-30 RX ORDER — BUPIVACAINE HYDROCHLORIDE 7.5 MG/ML
INJECTION, SOLUTION EPIDURAL; RETROBULBAR
Status: COMPLETED | OUTPATIENT
Start: 2020-10-30 | End: 2020-10-30

## 2020-10-30 RX ORDER — LIDOCAINE HYDROCHLORIDE AND EPINEPHRINE 10; 10 MG/ML; UG/ML
INJECTION, SOLUTION INFILTRATION; PERINEURAL AS NEEDED
Status: DISCONTINUED | OUTPATIENT
Start: 2020-10-30 | End: 2020-10-30 | Stop reason: HOSPADM

## 2020-10-30 RX ORDER — BUPIVACAINE HYDROCHLORIDE AND EPINEPHRINE 2.5; 5 MG/ML; UG/ML
INJECTION, SOLUTION EPIDURAL; INFILTRATION; INTRACAUDAL; PERINEURAL AS NEEDED
Status: DISCONTINUED | OUTPATIENT
Start: 2020-10-30 | End: 2020-10-30 | Stop reason: HOSPADM

## 2020-10-30 RX ORDER — SODIUM CHLORIDE, SODIUM LACTATE, POTASSIUM CHLORIDE, CALCIUM CHLORIDE 600; 310; 30; 20 MG/100ML; MG/100ML; MG/100ML; MG/100ML
1000 INJECTION, SOLUTION INTRAVENOUS CONTINUOUS
Status: DISCONTINUED | OUTPATIENT
Start: 2020-10-30 | End: 2020-10-30 | Stop reason: HOSPADM

## 2020-10-30 RX ORDER — ONDANSETRON 2 MG/ML
4 INJECTION INTRAMUSCULAR; INTRAVENOUS ONCE AS NEEDED
Status: DISCONTINUED | OUTPATIENT
Start: 2020-10-30 | End: 2020-10-30 | Stop reason: HOSPADM

## 2020-10-30 RX ORDER — IPRATROPIUM BROMIDE AND ALBUTEROL SULFATE 2.5; .5 MG/3ML; MG/3ML
3 SOLUTION RESPIRATORY (INHALATION) ONCE AS NEEDED
Status: DISCONTINUED | OUTPATIENT
Start: 2020-10-30 | End: 2020-10-30 | Stop reason: HOSPADM

## 2020-10-30 RX ORDER — LORAZEPAM 2 MG/ML
0.25 INJECTION INTRAMUSCULAR AS NEEDED
Status: DISCONTINUED | OUTPATIENT
Start: 2020-10-30 | End: 2020-10-30 | Stop reason: HOSPADM

## 2020-10-30 RX ORDER — ACETAMINOPHEN 500 MG
1000 TABLET ORAL ONCE
Status: COMPLETED | OUTPATIENT
Start: 2020-10-30 | End: 2020-10-30

## 2020-10-30 RX ORDER — CLINDAMYCIN PHOSPHATE 900 MG/50ML
900 INJECTION, SOLUTION INTRAVENOUS ONCE
Status: COMPLETED | OUTPATIENT
Start: 2020-10-30 | End: 2020-10-30

## 2020-10-30 RX ORDER — ONDANSETRON 2 MG/ML
INJECTION INTRAMUSCULAR; INTRAVENOUS AS NEEDED
Status: DISCONTINUED | OUTPATIENT
Start: 2020-10-30 | End: 2020-10-30 | Stop reason: SURG

## 2020-10-30 RX ADMIN — BUPIVACAINE HYDROCHLORIDE 1.2 ML: 7.5 INJECTION, SOLUTION EPIDURAL; RETROBULBAR at 07:51

## 2020-10-30 RX ADMIN — DEXAMETHASONE SODIUM PHOSPHATE 4 MG: 4 INJECTION, SOLUTION INTRAMUSCULAR; INTRAVENOUS at 07:57

## 2020-10-30 RX ADMIN — SODIUM CHLORIDE, POTASSIUM CHLORIDE, SODIUM LACTATE AND CALCIUM CHLORIDE 1000 ML: 600; 310; 30; 20 INJECTION, SOLUTION INTRAVENOUS at 07:09

## 2020-10-30 RX ADMIN — ACETAMINOPHEN 1000 MG: 500 TABLET, FILM COATED ORAL at 07:09

## 2020-10-30 RX ADMIN — CLINDAMYCIN PHOSPHATE 900 MG: 900 INJECTION, SOLUTION INTRAVENOUS at 07:57

## 2020-10-30 RX ADMIN — ONDANSETRON 4 MG: 2 INJECTION INTRAMUSCULAR; INTRAVENOUS at 07:50

## 2020-10-30 RX ADMIN — KETOROLAC TROMETHAMINE 30 MG: 30 INJECTION, SOLUTION INTRAMUSCULAR at 07:57

## 2020-10-30 NOTE — ANESTHESIA PROCEDURE NOTES
Spinal Block      Patient location during procedure: OR  Indication:procedure for pain  Performed By  CRNA: Dennis Randall CRNA  Preanesthetic Checklist  Completed: patient identified, site marked, surgical consent, pre-op evaluation, timeout performed, IV checked, risks and benefits discussed and monitors and equipment checked  Spinal Block Prep:  Patient Position:sitting  Sterile Tech:cap, gloves, gown, mask and sterile barriers  Prep:Chloraprep  Patient Monitoring:blood pressure monitoring, EKG and continuous pulse oximetry  Spinal Block Procedure  Approach:midline  Guidance:landmark technique and palpation technique  Location:L4-L5  Needle Type:Roberta  Needle Gauge:25 G  Placement of Spinal needle event:cerebrospinal fluid aspirated  Paresthesia: no  Fluid Appearance:clear  Medications: bupivacaine PF (MARCAINE) injection 0.75%, 1.2 mL  Med Administered at 10/30/2020 7:51 AM   Post Assessment  Patient Tolerance:patient tolerated the procedure well with no apparent complications  Complications no

## 2020-10-30 NOTE — ANESTHESIA PREPROCEDURE EVALUATION
Anesthesia Evaluation     Patient summary reviewed and Nursing notes reviewed   NPO Solid Status: > 8 hours  NPO Liquid Status: > 8 hours           Airway   Mallampati: I  TM distance: >3 FB  Neck ROM: full  No difficulty expected  Dental - normal exam     Pulmonary - normal exam   (+) sleep apnea,   Cardiovascular - normal exam  Exercise tolerance: good (4-7 METS)    ECG reviewed    (+) hypertension well controlled, dysrhythmias Atrial Fib, Tachycardia, hyperlipidemia,       Neuro/Psych  (+) headaches,     GI/Hepatic/Renal/Endo    (+) obesity,  hiatal hernia, GERD,  diabetes mellitus well controlled,     Musculoskeletal     (+) arthralgias, back pain, myalgias,   Abdominal  - normal exam    Bowel sounds: normal.   Substance History - negative use     OB/GYN negative ob/gyn ROS         Other   arthritis,      ROS/Med Hx Other: SVT/afib occurred when on levaquin and was side effect                Anesthesia Plan    ASA 3     spinal   (Patient requested as little as necessary for anesthesia and expressed concern for postop cognitive dysfunction which happen to her 79 y/o mother.   We discussed option of spinal block with and without sedation. She requested spinal block after reviewing risks/benefits.)  intravenous induction     Anesthetic plan, all risks, benefits, and alternatives have been provided, discussed and informed consent has been obtained with: patient.    Plan discussed with attending.

## 2020-10-30 NOTE — ANESTHESIA POSTPROCEDURE EVALUATION
Patient: Tracy Barrios    Procedure Summary     Date: 10/30/20 Room / Location: [facility] OR  /  [facility]    Anesthesia Start: 0746 Anesthesia Stop: 0830    Procedure: Right knee diagnostic arthroscopy partial lateral meniscectomy (Right Knee) Diagnosis:       Preop examination      (Preop examination [Z01.818])    Surgeon: Robert Saeed MD Provider: Irving Moffett CRNA    Anesthesia Type: spinal ASA Status: 3          Anesthesia Type: spinal    Vitals  Vitals Value Taken Time   BP 92/59 10/30/20 0831   Temp     Pulse 82 10/30/20 0831   Resp     SpO2 97 % 10/30/20 0831   Vitals shown include unvalidated device data.        Post Anesthesia Care and Evaluation    Patient location during evaluation: PACU  Patient participation: complete - patient participated  Level of consciousness: awake and sleepy but conscious  Pain management: adequate  Airway patency: patent  Anesthetic complications: No anesthetic complications  PONV Status: none  Cardiovascular status: acceptable and hemodynamically stable  Respiratory status: acceptable, room air, nonlabored ventilation and spontaneous ventilation  Hydration status: acceptable

## 2020-11-12 ENCOUNTER — OFFICE VISIT (OUTPATIENT)
Dept: ORTHOPEDIC SURGERY | Facility: CLINIC | Age: 70
End: 2020-11-12

## 2020-11-12 VITALS — HEIGHT: 65 IN | WEIGHT: 220 LBS | BODY MASS INDEX: 36.65 KG/M2 | RESPIRATION RATE: 18 BRPM

## 2020-11-12 DIAGNOSIS — S83.271D COMPLEX TEAR OF LATERAL MENISCUS OF RIGHT KNEE AS CURRENT INJURY, SUBSEQUENT ENCOUNTER: Primary | ICD-10-CM

## 2020-11-12 DIAGNOSIS — M17.10 ARTHRITIS OF KNEE: ICD-10-CM

## 2020-11-12 PROCEDURE — 99024 POSTOP FOLLOW-UP VISIT: CPT | Performed by: ORTHOPAEDIC SURGERY

## 2020-11-12 NOTE — PROGRESS NOTES
Subjective   Patient ID: Tracy Barrios is a 70 y.o. female  Post-op and Pain of the Right Knee (10/30/2020    Right knee diagnostic arthroscopy, partial lateral  menisectomy and two                        compartment chondroplasty.) and Suture / Staple Removal             History of Present Illness  She returns status post right knee arthroscopy with partial lateral meniscectomy 2 compartment chondroplasty overall feels much pain relief no incisional issues no calf tenderness is able to fully weight-bear does not require use of assistive device.      Review of Systems   Constitutional: Negative for fever.   HENT: Negative for dental problem and voice change.    Eyes: Negative for visual disturbance.   Respiratory: Negative for shortness of breath.    Cardiovascular: Negative for chest pain.   Gastrointestinal: Negative for abdominal pain.   Genitourinary: Negative for dysuria.   Musculoskeletal: Positive for arthralgias. Negative for gait problem and joint swelling.   Skin: Negative for rash.   Neurological: Negative for speech difficulty.   Hematological: Does not bruise/bleed easily.   Psychiatric/Behavioral: Negative for confusion.       Past Medical History:   Diagnosis Date   • Arrhythmia    • Arthritis    • Atrial fibrillation (CMS/HCC)    • DDD (degenerative disc disease), cervical    • DDD (degenerative disc disease), thoracic    • Diabetes mellitus (CMS/HCC)    • Dyslipidemia    • GERD (gastroesophageal reflux disease)     GERD/ Hiatal Hernia   • Hiatal hernia    • Hyperlipidemia    • Hypertension    • Macular degeneration    • Migraine     Migraine headaches   • SVT (supraventricular tachycardia) (CMS/HCC)    • Tinnitus    • Wears glasses     for distance only        Past Surgical History:   Procedure Laterality Date   • ABLATION OF DYSRHYTHMIC FOCUS     • APPENDECTOMY     • BLADDER SURGERY      bladder tack   • BREAST BIOPSY Right 1990    Ultrasound guided   • CARDIAC CATHETERIZATION     • HYSTERECTOMY   1991    Total w/BSO/complete   • KNEE ARTHROSCOPY Right 10/30/2020    Procedure: Right knee diagnostic arthroscopy partial lateral meniscectomy;  Surgeon: Robert Saeed MD;  Location: [facility];  Service: Orthopedics;  Laterality: Right;   • REDUCTION MAMMAPLASTY Bilateral 1992   • TONSILLECTOMY     • VAGINAL DELIVERY      x2       Family History   Problem Relation Age of Onset   • Breast cancer Mother 50   • Cancer Mother         breast   • Hypertension Mother    • Heart attack Father    • Coronary artery disease Father    • Cancer Maternal Grandmother         Ovarian       Social History     Socioeconomic History   • Marital status:      Spouse name: Not on file   • Number of children: Not on file   • Years of education: Not on file   • Highest education level: Not on file   Tobacco Use   • Smoking status: Never Smoker   • Smokeless tobacco: Never Used   Substance and Sexual Activity   • Alcohol use: No   • Drug use: No   • Sexual activity: Defer       I have reviewed the medical and surgical history, family history, social history, medications, and/or allergies, and the review of systems of this report.    Allergies   Allergen Reactions   • Levofloxacin Anaphylaxis   • Statins      muscle pain   • Penicillins Rash         Current Outpatient Medications:   •  Ca Phosphate-Cholecalciferol (Calcium/Vitamin D3 Gummies) 250-350 MG-UNIT chewable tablet, Chew 1 tablet Daily., Disp: , Rfl:   •  Cholecalciferol (VITAMIN D3) 5000 UNITS capsule capsule, Take 5,000 Units by mouth 1 (One) Time Per Week., Disp: , Rfl:   •  Cyanocobalamin (VITAMIN B 12 PO), Take 1 tablet by mouth Daily., Disp: , Rfl:   •  HYDROcodone-acetaminophen (NORCO) 5-325 MG per tablet, Take 1 tablet by mouth Every 6 (Six) Hours As Needed for Moderate Pain, Disp: 6 tablet, Rfl: 0  •  lisinopril (PRINIVIL,ZESTRIL) 2.5 MG tablet, Take 1 tablet by mouth Daily for blood pressure, Disp: 90 tablet, Rfl: 3  •  meloxicam (MOBIC) 15 MG tablet, Take 1  "tablet by mouth once daily, Disp: 30 tablet, Rfl: 5  •  metFORMIN ER (GLUCOPHAGE-XR) 500 MG 24 hr tablet, Take 1 tablet by mouth once daily, Disp: 30 tablet, Rfl: 5  •  pantoprazole (PROTONIX) 40 MG EC tablet, Take 1 tablet by mouth once daily (Patient taking differently: Take 40 mg by mouth.), Disp: 90 tablet, Rfl: 3  •  vitamin C (ASCORBIC ACID) 500 MG tablet, Take 500 mg by mouth Daily., Disp: , Rfl:     Objective   Resp 18   Ht 165.1 cm (65\")   Wt 99.8 kg (220 lb)   LMP  (LMP Unknown)   BMI 36.61 kg/m²    Physical Exam  Constitutional: Patient is oriented to person, place, and time. Patient appears well-developed and well-nourished.   HENT:Head: Normocephalic and atraumatic.   Eyes: EOM are normal. Pupils are equal, round, and reactive to light.   Neck: Normal range of motion. Neck supple.   Cardiovascular: Normal rate.    Pulmonary/Chest: Effort normal and breath sounds normal.   Abdominal: Soft.   Neurological: Patient is alert and oriented to person, place, and time.   Skin: Skin is warm and dry.   Psychiatric: Patient has a normal mood and affect.   Nursing note and vitals reviewed.       [unfilled]   Right knee: Incisions well-healed sutures removed Steri-Strips applied calf supple Homans' sign negative, full range of motion knee very trace effusion minimal tenderness throughout full excursion.    Assessment/Plan   Review of Radiographic Studies:    No new imaging done today.      Procedures     Diagnoses and all orders for this visit:    1. Complex tear of lateral meniscus of right knee as current injury, subsequent encounter (Primary)    2. Arthritis of knee       Orthopedic activities reviewed and patient expressed appreciation      Recommendations/Plan:   Work/Activity Status: May perform usual activities as tolerated    Patient agreeable to call or return sooner for any concerns.         I reviewed the patient's radiographs indicating advanced osteoarthritis discussed the natural history treatment " options and pros and cons and risks and complications of surgical and nonsurgical care.  I also reviewed advantages and disadvantages risks and complications of steroid injections versus viscus gel injections.  The patient had opportunity to ask questions which were answered.    Impression:  Status post right knee arthroscopic partial lateral meniscectomy 2 compartment chondroplasty with underlying osteoarthritis     plan:  Resume normal activities local wound care return as needed consider steroid injection in the future if pain recurs

## 2021-03-02 ENCOUNTER — OFFICE VISIT (OUTPATIENT)
Dept: ORTHOPEDIC SURGERY | Facility: CLINIC | Age: 71
End: 2021-03-02

## 2021-03-02 VITALS — WEIGHT: 217.4 LBS | BODY MASS INDEX: 36.22 KG/M2 | TEMPERATURE: 96.8 F | HEIGHT: 65 IN

## 2021-03-02 DIAGNOSIS — M23.91 INTERNAL DERANGEMENT OF KNEE JOINT, RIGHT: ICD-10-CM

## 2021-03-02 DIAGNOSIS — M17.10 ARTHRITIS OF KNEE: ICD-10-CM

## 2021-03-02 DIAGNOSIS — S83.271D COMPLEX TEAR OF LATERAL MENISCUS OF RIGHT KNEE AS CURRENT INJURY, SUBSEQUENT ENCOUNTER: Primary | ICD-10-CM

## 2021-03-02 DIAGNOSIS — M25.561 ARTHRALGIA OF RIGHT KNEE: ICD-10-CM

## 2021-03-02 PROCEDURE — 20610 DRAIN/INJ JOINT/BURSA W/O US: CPT | Performed by: ORTHOPAEDIC SURGERY

## 2021-03-02 PROCEDURE — 99213 OFFICE O/P EST LOW 20 MIN: CPT | Performed by: ORTHOPAEDIC SURGERY

## 2021-03-02 RX ORDER — TRIAMCINOLONE ACETONIDE 40 MG/ML
40 INJECTION, SUSPENSION INTRA-ARTICULAR; INTRAMUSCULAR
Status: COMPLETED | OUTPATIENT
Start: 2021-03-02 | End: 2021-03-02

## 2021-03-02 RX ORDER — LIDOCAINE HYDROCHLORIDE 10 MG/ML
2 INJECTION, SOLUTION INFILTRATION; PERINEURAL
Status: COMPLETED | OUTPATIENT
Start: 2021-03-02 | End: 2021-03-02

## 2021-03-02 RX ADMIN — TRIAMCINOLONE ACETONIDE 40 MG: 40 INJECTION, SUSPENSION INTRA-ARTICULAR; INTRAMUSCULAR at 13:19

## 2021-03-02 RX ADMIN — LIDOCAINE HYDROCHLORIDE 2 ML: 10 INJECTION, SOLUTION INFILTRATION; PERINEURAL at 13:19

## 2021-03-02 NOTE — PROGRESS NOTES
Subjective   Patient ID: Tracy Barrios is a 70 y.o. female  Follow-up of the Right Knee (S/P right knee arthroscopy, partial lateral menisectomy 10/30/20. Patient states she is still having pain & swelling. )             History of Present Illness  She has persistent pain in the right knee especially with activity when she is caring for her grandchildren and her parents, finds relief with rest medications do not help much she is due to be seen by rheumatology soon for evaluation for inflammatory arthritis.  No new falls or injuries.      Review of Systems   Constitutional: Negative for fever.   HENT: Negative for dental problem and voice change.    Eyes: Negative for visual disturbance.   Respiratory: Negative for shortness of breath.    Cardiovascular: Negative for chest pain.   Gastrointestinal: Negative for abdominal pain.   Genitourinary: Negative for dysuria.   Musculoskeletal: Positive for arthralgias, gait problem (occasionally uses walker) and joint swelling.   Skin: Negative for rash.   Neurological: Negative for speech difficulty.   Hematological: Does not bruise/bleed easily.   Psychiatric/Behavioral: Negative for confusion.       Past Medical History:   Diagnosis Date   • Arrhythmia    • Arthritis    • Atrial fibrillation (CMS/HCC)    • DDD (degenerative disc disease), cervical    • DDD (degenerative disc disease), thoracic    • Diabetes mellitus (CMS/HCC)    • Dyslipidemia    • GERD (gastroesophageal reflux disease)     GERD/ Hiatal Hernia   • Hiatal hernia    • Hyperlipidemia    • Hypertension    • Macular degeneration    • Migraine     Migraine headaches   • SVT (supraventricular tachycardia) (CMS/HCC)    • Tinnitus    • Wears glasses     for distance only        Past Surgical History:   Procedure Laterality Date   • ABLATION OF DYSRHYTHMIC FOCUS     • APPENDECTOMY     • BLADDER SURGERY      bladder tack   • BREAST BIOPSY Right 1990    Ultrasound guided   • CARDIAC CATHETERIZATION     • HYSTERECTOMY   1991    Total w/BSO/complete   • KNEE ARTHROSCOPY Right 10/30/2020    Procedure: Right knee diagnostic arthroscopy partial lateral meniscectomy;  Surgeon: Robert Saeed MD;  Location: [facility];  Service: Orthopedics;  Laterality: Right;   • REDUCTION MAMMAPLASTY Bilateral 1992   • TONSILLECTOMY     • VAGINAL DELIVERY      x2       Family History   Problem Relation Age of Onset   • Breast cancer Mother 50   • Cancer Mother         breast   • Hypertension Mother    • Heart attack Father    • Coronary artery disease Father    • Cancer Maternal Grandmother         Ovarian       Social History     Socioeconomic History   • Marital status:      Spouse name: Not on file   • Number of children: Not on file   • Years of education: Not on file   • Highest education level: Not on file   Tobacco Use   • Smoking status: Never Smoker   • Smokeless tobacco: Never Used   Substance and Sexual Activity   • Alcohol use: No   • Drug use: No   • Sexual activity: Defer       I have reviewed the medical and surgical history, family history, social history, medications, and/or allergies, and the review of systems of this report.    Allergies   Allergen Reactions   • Levofloxacin Anaphylaxis   • Statins      muscle pain   • Penicillins Rash         Current Outpatient Medications:   •  Ca Phosphate-Cholecalciferol (Calcium/Vitamin D3 Gummies) 250-350 MG-UNIT chewable tablet, Chew 1 tablet Daily., Disp: , Rfl:   •  Cholecalciferol (VITAMIN D3) 5000 UNITS capsule capsule, Take 5,000 Units by mouth 1 (One) Time Per Week., Disp: , Rfl:   •  Cyanocobalamin (VITAMIN B 12 PO), Take 1 tablet by mouth Daily., Disp: , Rfl:   •  lisinopril (PRINIVIL,ZESTRIL) 2.5 MG tablet, Take 1 tablet by mouth Daily for blood pressure, Disp: 90 tablet, Rfl: 3  •  meloxicam (MOBIC) 15 MG tablet, Take 1 tablet by mouth Daily as needed, Disp: 90 tablet, Rfl: 2  •  metFORMIN ER (GLUCOPHAGE-XR) 500 MG 24 hr tablet, Take 1 tablet by mouth once daily, Disp: 30  "tablet, Rfl: 5  •  pantoprazole (PROTONIX) 40 MG EC tablet, Take 1 tablet by mouth once daily (Patient taking differently: Take 40 mg by mouth.), Disp: 90 tablet, Rfl: 3  •  vitamin C (ASCORBIC ACID) 500 MG tablet, Take 500 mg by mouth Daily., Disp: , Rfl:     Objective   Temp 96.8 °F (36 °C)   Ht 165.1 cm (65\")   Wt 98.6 kg (217 lb 6.4 oz)   LMP  (LMP Unknown)   BMI 36.18 kg/m²    Physical Exam  Constitutional: Patient is oriented to person, place, and time. Patient appears well-developed and well-nourished.   HENT:Head: Normocephalic and atraumatic.   Eyes: EOM are normal. Pupils are equal, round, and reactive to light.   Neck: Normal range of motion. Neck supple.   Cardiovascular: Normal rate.    Pulmonary/Chest: Effort normal and breath sounds normal.   Abdominal: Soft.   Neurological: Patient is alert and oriented to person, place, and time.   Skin: Skin is warm and dry.   Psychiatric: Patient has a normal mood and affect.   Nursing note and vitals reviewed.       [unfilled]   Right knee: Trace effusion minimal warmth slight swelling flexion only to 110 with pain at the patellofemoral area Gerardo sign negative for posterior medial joint line pain calf supple negative straight leg raising Homans' sign negative.    Assessment/Plan   Review of Radiographic Studies:    No new imaging done today.      Large Joint Arthrocentesis: R knee  Date/Time: 3/2/2021 1:19 PM  Consent given by: patient  Site marked: site marked  Timeout: Immediately prior to procedure a time out was called to verify the correct patient, procedure, equipment, support staff and site/side marked as required   Supporting Documentation  Indications: pain   Procedure Details  Location: knee - R knee  Preparation: Patient was prepped and draped in the usual sterile fashion  Needle size: 22 G  Medications administered: 40 mg triamcinolone acetonide 40 MG/ML; 2 mL lidocaine 1 %  Patient tolerance: patient tolerated the procedure well with no " immediate complications           Diagnoses and all orders for this visit:    1. Complex tear of lateral meniscus of right knee as current injury, subsequent encounter (Primary)    2. Arthritis of knee    3. Internal derangement of knee joint, right    4. Arthralgia of right knee       Orthopedic activities reviewed and patient expressed appreciation and Using illustrations and models, the nature of the pathology was explained to the patient      Recommendations/Plan:   Work/Activity Status: May perform usual activities as tolerated    Patient agreeable to call or return sooner for any concerns.       I reviewed the patient's radiographs indicating advanced osteoarthritis discussed the natural history treatment options and pros and cons and risks and complications of surgical and nonsurgical care.  I also reviewed advantages and disadvantages risks and complications of steroid injections versus viscus gel injections.  The patient had opportunity to ask questions which were answered.      Impression:  Status post arthroscopic debridement of osteoarthritis chondromalacia right knee, under care of rheumatology for inflammatory arthritis  Plan:  Rheumatology to manage medications, return for repeat steroid injection in 3 to 6 months as needed

## 2021-03-26 ENCOUNTER — TELEPHONE (OUTPATIENT)
Dept: ORTHOPEDIC SURGERY | Facility: CLINIC | Age: 71
End: 2021-03-26

## 2021-03-26 NOTE — TELEPHONE ENCOUNTER
Provider:  DR. CARMEN MARC    Caller:  PRERNA CANO    Relationship to Patient:  SELF    Phone Number:  [phone]    Reason for Call:  WORSENING RIGHT KNEE PAIN. HAD INJECTION BY DR. MARC ON 3/2/21. SHOULD SHE BE SEEN AGAIN OR HAVE TESTING?

## 2021-03-26 NOTE — TELEPHONE ENCOUNTER
Spoke with patient, she saw new Rheumatologist last week, Dr. Stanford. She changed her arthritis medicine to Sulindac. She doesn't feel it is helping and may go back to Meloxicam, she feels this helped more for pain. She states pain is worse in knee, increased swelling. Pain in lateral aspect of knee, swelling goes from knee to mid thigh. Knee is warm to touch, no redness, she states she is afebrile.She feels like there is fluid on the knee, feels different than it has before. She is in [place] now, will come home tomorrow. Scheduled her to see Dr. Saeed on 3/30/21. She is fine with this, and advised if pain or swelling worsens before appt, she will go to ER.

## 2021-03-30 ENCOUNTER — OFFICE VISIT (OUTPATIENT)
Dept: ORTHOPEDIC SURGERY | Facility: CLINIC | Age: 71
End: 2021-03-30

## 2021-03-30 VITALS — BODY MASS INDEX: 36.15 KG/M2 | TEMPERATURE: 97.5 F | WEIGHT: 217 LBS | HEIGHT: 65 IN

## 2021-03-30 DIAGNOSIS — M17.10 ARTHRITIS OF KNEE: ICD-10-CM

## 2021-03-30 DIAGNOSIS — M25.561 ARTHRALGIA OF RIGHT KNEE: Primary | ICD-10-CM

## 2021-03-30 PROCEDURE — 99214 OFFICE O/P EST MOD 30 MIN: CPT | Performed by: ORTHOPAEDIC SURGERY

## 2021-03-30 RX ORDER — METHYLPREDNISOLONE 4 MG/1
TABLET ORAL
Qty: 21 TABLET | Refills: 0 | Status: SHIPPED | OUTPATIENT
Start: 2021-03-30 | End: 2021-08-05

## 2021-03-30 NOTE — PROGRESS NOTES
Subjective   Patient ID: Tracy Barrios is a 70 y.o. female  Pain and Edema of the Right Knee (Increased pain and swelling since last visit on 3/2/21. Injection did not help to relieve pain. )             History of Present Illness  7-year-old with persistent right knee pain difficulty with walking swelling burning pain occurs laterally injection on 3/2/2021 gave her very minimal relief actually made her knee hurt worse she says, denies fall groin pain paresthesias pain or swelling in the calf.      Review of Systems   Constitutional: Negative for fever.   HENT: Negative for dental problem and voice change.    Eyes: Negative for visual disturbance.   Respiratory: Negative for shortness of breath.    Cardiovascular: Negative for chest pain.   Gastrointestinal: Negative for abdominal pain.   Genitourinary: Negative for dysuria.   Musculoskeletal: Positive for arthralgias and joint swelling. Negative for gait problem.   Skin: Negative for rash.   Neurological: Negative for speech difficulty.   Hematological: Does not bruise/bleed easily.   Psychiatric/Behavioral: Negative for confusion.       Past Medical History:   Diagnosis Date   • Arrhythmia    • Arthritis    • Atrial fibrillation (CMS/HCC)    • DDD (degenerative disc disease), cervical    • DDD (degenerative disc disease), thoracic    • Diabetes mellitus (CMS/HCC)    • Dyslipidemia    • GERD (gastroesophageal reflux disease)     GERD/ Hiatal Hernia   • Hiatal hernia    • Hyperlipidemia    • Hypertension    • Macular degeneration    • Migraine     Migraine headaches   • SVT (supraventricular tachycardia) (CMS/HCC)    • Tinnitus    • Wears glasses     for distance only        Past Surgical History:   Procedure Laterality Date   • ABLATION OF DYSRHYTHMIC FOCUS     • APPENDECTOMY     • BLADDER SURGERY      bladder tack   • BREAST BIOPSY Right 1990    Ultrasound guided   • CARDIAC CATHETERIZATION     • HYSTERECTOMY  1991    Total w/BSO/complete   • KNEE ARTHROSCOPY  Right 10/30/2020    Procedure: Right knee diagnostic arthroscopy partial lateral meniscectomy;  Surgeon: Robert Saeed MD;  Location: [facility];  Service: Orthopedics;  Laterality: Right;   • REDUCTION MAMMAPLASTY Bilateral 1992   • TONSILLECTOMY     • VAGINAL DELIVERY      x2       Family History   Problem Relation Age of Onset   • Breast cancer Mother 50   • Cancer Mother         breast   • Hypertension Mother    • Heart attack Father    • Coronary artery disease Father    • Cancer Maternal Grandmother         Ovarian       Social History     Socioeconomic History   • Marital status:      Spouse name: Not on file   • Number of children: Not on file   • Years of education: Not on file   • Highest education level: Not on file   Tobacco Use   • Smoking status: Never Smoker   • Smokeless tobacco: Never Used   Vaping Use   • Vaping Use: Never used   Substance and Sexual Activity   • Alcohol use: No   • Drug use: No   • Sexual activity: Defer       I have reviewed the medical and surgical history, family history, social history, medications, and/or allergies, and the review of systems of this report.    Allergies   Allergen Reactions   • Levofloxacin Anaphylaxis   • Statins      muscle pain   • Penicillins Rash         Current Outpatient Medications:   •  Ca Phosphate-Cholecalciferol (Calcium/Vitamin D3 Gummies) 250-350 MG-UNIT chewable tablet, Chew 1 tablet Daily., Disp: , Rfl:   •  Cholecalciferol (VITAMIN D3) 5000 UNITS capsule capsule, Take 5,000 Units by mouth 1 (One) Time Per Week., Disp: , Rfl:   •  Cyanocobalamin (VITAMIN B 12 PO), Take 1 tablet by mouth Daily., Disp: , Rfl:   •  lisinopril (PRINIVIL,ZESTRIL) 2.5 MG tablet, Take 1 tablet by mouth Daily for blood pressure, Disp: 90 tablet, Rfl: 3  •  metFORMIN ER (GLUCOPHAGE-XR) 500 MG 24 hr tablet, Take 1 tablet by mouth once daily, Disp: 30 tablet, Rfl: 5  •  pantoprazole (PROTONIX) 40 MG EC tablet, Take 1 tablet by mouth once daily (Patient taking  "differently: Take 40 mg by mouth.), Disp: 90 tablet, Rfl: 3  •  sulindac (CLINORIL) 150 MG tablet, Take 1 tablet by mouth Every 12 (Twelve) Hours with food, Disp: 60 tablet, Rfl: 4  •  vitamin C (ASCORBIC ACID) 500 MG tablet, Take 500 mg by mouth Daily., Disp: , Rfl:   •  methylPREDNISolone (MEDROL) 4 MG dose pack, Take as directed on package instructions., Disp: 21 tablet, Rfl: 0    Objective   Temp 97.5 °F (36.4 °C)   Ht 165.1 cm (65\")   Wt 98.4 kg (217 lb)   LMP  (LMP Unknown)   BMI 36.11 kg/m²    Physical Exam  Constitutional: Patient is oriented to person, place, and time. Patient appears well-developed and well-nourished.   HENT:Head: Normocephalic and atraumatic.   Eyes: EOM are normal. Pupils are equal, round, and reactive to light.   Neck: Normal range of motion. Neck supple.   Cardiovascular: Normal rate.    Pulmonary/Chest: Effort normal and breath sounds normal.   Abdominal: Soft.   Neurological: Patient is alert and oriented to person, place, and time.   Skin: Skin is warm and dry.   Psychiatric: Patient has a normal mood and affect.   Nursing note and vitals reviewed.       [unfilled]   Right knee: Valgus alignment of 10 to 15 degrees loss of extension 5 degrees flexion only to 105, calf supple neurovascularly intact minimal medial compartment pain with range of motion.    Assessment/Plan   Review of Radiographic Studies:    Indication to evaluate joint condition, and compared with prior images, shows evident chronic advanced osteoarthritis.      Procedures     Diagnoses and all orders for this visit:    1. Arthralgia of right knee (Primary)  -     XR Knee 1 or 2 View Right; Future    2. Arthritis of knee    Other orders  -     methylPREDNISolone (MEDROL) 4 MG dose pack; Take as directed on package instructions.  Dispense: 21 tablet; Refill: 0       Orthopedic activities reviewed and patient expressed appreciation, Risk, benefits, and merits of treatment alternatives reviewed with the patient and " questions answered and Using illustrations and models, the nature of the pathology was explained to the patient      Recommendations/Plan:   Work/Activity Status: May perform usual activities as tolerated    Patient agreeable to call or return sooner for any concerns.         I reviewed the patient's radiographs indicating advanced osteoarthritis discussed the natural history treatment options and pros and cons and risks and complications of surgical and nonsurgical care.  I also reviewed advantages and disadvantages risks and complications of steroid injections versus viscus gel injections.  The patient had opportunity to ask questions which were answered.    Reviewed and discussed with her about indications for arthroplasty pros cons risk and complications her questions were answered    Impression:  Right knee lateral compartment osteoarthritis valgus type moderate to severe  Plan:  Trial of Medrol Dosepak, rheumatology to monitor and follow use of Clinoril, if she is not improved in several weeks she will call and schedule viscous gel injection series to right knee

## 2021-07-28 ENCOUNTER — APPOINTMENT (OUTPATIENT)
Dept: PREADMISSION TESTING | Facility: HOSPITAL | Age: 71
End: 2021-07-28

## 2021-08-05 ENCOUNTER — HOSPITAL ENCOUNTER (OUTPATIENT)
Dept: GENERAL RADIOLOGY | Facility: HOSPITAL | Age: 71
Discharge: HOME | End: 2021-08-05

## 2021-08-05 ENCOUNTER — PRE-ADMISSION TESTING (OUTPATIENT)
Dept: PREADMISSION TESTING | Facility: HOSPITAL | Age: 71
End: 2021-08-05

## 2021-08-05 VITALS — BODY MASS INDEX: 36.33 KG/M2 | WEIGHT: 218.03 LBS | HEIGHT: 65 IN

## 2021-08-05 LAB
ANION GAP SERPL CALCULATED.3IONS-SCNC: 10 MMOL/L (ref 5–15)
BUN SERPL-MCNC: 22 MG/DL (ref 8–23)
BUN/CREAT SERPL: 26.5 (ref 7–25)
CALCIUM SPEC-SCNC: 10 MG/DL (ref 8.6–10.5)
CHLORIDE SERPL-SCNC: 104 MMOL/L (ref 98–107)
CO2 SERPL-SCNC: 25 MMOL/L (ref 22–29)
CREAT SERPL-MCNC: 0.83 MG/DL (ref 0.57–1)
DEPRECATED RDW RBC AUTO: 44.9 FL (ref 37–54)
ERYTHROCYTE [DISTWIDTH] IN BLOOD BY AUTOMATED COUNT: 13 % (ref 12.3–15.4)
GFR SERPL CREATININE-BSD FRML MDRD: 68 ML/MIN/1.73
GLUCOSE SERPL-MCNC: 98 MG/DL (ref 65–99)
HBA1C MFR BLD: 5.8 % (ref 4.8–5.6)
HCT VFR BLD AUTO: 43.5 % (ref 34–46.6)
HGB BLD-MCNC: 14.1 G/DL (ref 12–15.9)
MCH RBC QN AUTO: 30.9 PG (ref 26.6–33)
MCHC RBC AUTO-ENTMCNC: 32.4 G/DL (ref 31.5–35.7)
MCV RBC AUTO: 95.2 FL (ref 79–97)
PLATELET # BLD AUTO: 312 10*3/MM3 (ref 140–450)
PMV BLD AUTO: 8.7 FL (ref 6–12)
POTASSIUM SERPL-SCNC: 4.4 MMOL/L (ref 3.5–5.2)
QT INTERVAL: 376 MS
QTC INTERVAL: 408 MS
RBC # BLD AUTO: 4.57 10*6/MM3 (ref 3.77–5.28)
SODIUM SERPL-SCNC: 139 MMOL/L (ref 136–145)
WBC # BLD AUTO: 8.01 10*3/MM3 (ref 3.4–10.8)

## 2021-08-05 PROCEDURE — 93005 ELECTROCARDIOGRAM TRACING: CPT

## 2021-08-05 PROCEDURE — 80048 BASIC METABOLIC PNL TOTAL CA: CPT

## 2021-08-05 PROCEDURE — 93010 ELECTROCARDIOGRAM REPORT: CPT | Performed by: INTERNAL MEDICINE

## 2021-08-05 PROCEDURE — 85027 COMPLETE CBC AUTOMATED: CPT

## 2021-08-05 PROCEDURE — 36415 COLL VENOUS BLD VENIPUNCTURE: CPT

## 2021-08-05 PROCEDURE — 71046 X-RAY EXAM CHEST 2 VIEWS: CPT

## 2021-08-05 PROCEDURE — 83036 HEMOGLOBIN GLYCOSYLATED A1C: CPT

## 2021-08-05 ASSESSMENT — KOOS JR
KOOS JR SCORE: 42.281
KOOS JR SCORE: 18

## 2021-08-05 NOTE — PAT
Patient to apply Chlorhexadine wipes  to surgical area (as instructed) the night before procedure and the AM of procedure. Wipes provided.    Patient instructed to drink 20 ounces (or until full) of Gatorade and it needs to be completed 1 hour (for Main OR patients) or 2 hours (scheduled  section patients) before given arrival time for procedure (NO RED Gatorade)    Patient verbalized understanding.    Per Anesthesia Request, patient instructed not to take their ACE/ARB medications on the AM of surgery.    Patient did not review general PAT education video as instructed in their preoperative information received from their surgeon.  One-on-one Pre Admission Testing general education provided during PAT visit.  Copies of PAT general education handouts (Incentive Spirometry, Meds to Beds Program, Patient Belongings, Pre-op skin preparation instructions, Blood Glucose testing, Visitor policy, Surgery FAQ, Code H) distributed to patient. Encouraged patient/family to read PAT general education handouts thoroughly and notify PAT staff with any questions or concerns. Patient instructed to bring PAT pass and completed skin prep sheet (if applicable) on the day of procedure. Patient verbalized understanding of all information and priority content.     Discussed with patient options for receiving total joint replacement education and assessed patient's ability and preference. Joint Replacement Guide given to patient during PAT visit since not received a copy within the last year. Encouraged patient/family to read guide thoroughly and notify PAT staff with any questions or concerns. Handout provided directing patient to links to watch online videos related to joint replacement surgery on the [facility] website. The handout gives detailed instructions for joining an online joint replacement class through Zoom or phone conference offered on . Patient agreed to participate by watching videos online. Patient  verbalized understanding of instructions and to complete the online learning tool survey. Encouraged to share information with family and/or . An overview of the joint replacement education was provided during the visit including general perioperative instructions that are routine for all surgical patients (PAT PASS, wipes, directions to pre-op, etc.).    EKG in chart.     Patient directed to Radiology Department for CXR after Pre Admission Testing Appointment.

## 2021-08-10 ENCOUNTER — APPOINTMENT (OUTPATIENT)
Dept: PREADMISSION TESTING | Facility: HOSPITAL | Age: 71
End: 2021-08-10

## 2021-08-10 LAB — SARS-COV-2 RNA PNL SPEC NAA+PROBE: NOT DETECTED

## 2021-08-10 PROCEDURE — C9803 HOPD COVID-19 SPEC COLLECT: HCPCS

## 2021-08-10 PROCEDURE — U0004 COV-19 TEST NON-CDC HGH THRU: HCPCS

## 2021-08-11 ENCOUNTER — ANESTHESIA EVENT (OUTPATIENT)
Dept: PERIOP | Facility: HOSPITAL | Age: 71
End: 2021-08-11

## 2021-08-11 RX ORDER — SODIUM CHLORIDE 0.9 % (FLUSH) 0.9 %
10 SYRINGE (ML) INJECTION EVERY 12 HOURS SCHEDULED
Status: CANCELLED | OUTPATIENT
Start: 2021-08-11

## 2021-08-11 RX ORDER — SODIUM CHLORIDE 0.9 % (FLUSH) 0.9 %
10 SYRINGE (ML) INJECTION AS NEEDED
Status: CANCELLED | OUTPATIENT
Start: 2021-08-11

## 2021-08-11 RX ORDER — FAMOTIDINE 10 MG/ML
20 INJECTION, SOLUTION INTRAVENOUS ONCE
Status: CANCELLED | OUTPATIENT
Start: 2021-08-11 | End: 2021-08-11

## 2021-08-12 ENCOUNTER — ANESTHESIA EVENT CONVERTED (OUTPATIENT)
Dept: ANESTHESIOLOGY | Facility: HOSPITAL | Age: 71
End: 2021-08-12

## 2021-08-12 ENCOUNTER — APPOINTMENT (OUTPATIENT)
Dept: GENERAL RADIOLOGY | Facility: HOSPITAL | Age: 71
End: 2021-08-12

## 2021-08-12 ENCOUNTER — HOSPITAL ENCOUNTER (OUTPATIENT)
Facility: HOSPITAL | Age: 71
Discharge: HOME | End: 2021-08-13
Attending: ORTHOPAEDIC SURGERY | Admitting: ORTHOPAEDIC SURGERY

## 2021-08-12 ENCOUNTER — ANESTHESIA (OUTPATIENT)
Dept: PERIOP | Facility: HOSPITAL | Age: 71
End: 2021-08-12

## 2021-08-12 DIAGNOSIS — Z96.651 HISTORY OF TOTAL RIGHT KNEE REPLACEMENT: Primary | ICD-10-CM

## 2021-08-12 DIAGNOSIS — Z96.651 STATUS POST TOTAL RIGHT KNEE REPLACEMENT: ICD-10-CM

## 2021-08-12 LAB
GLUCOSE BLDC GLUCOMTR-MCNC: 103 MG/DL (ref 70–130)
GLUCOSE BLDC GLUCOMTR-MCNC: 96 MG/DL (ref 70–130)

## 2021-08-12 PROCEDURE — 25010000002 MORPHINE PER 10 MG: Performed by: ORTHOPAEDIC SURGERY

## 2021-08-12 PROCEDURE — C1755 CATHETER, INTRASPINAL: HCPCS | Performed by: ORTHOPAEDIC SURGERY

## 2021-08-12 PROCEDURE — C1776 JOINT DEVICE (IMPLANTABLE): HCPCS | Performed by: ORTHOPAEDIC SURGERY

## 2021-08-12 PROCEDURE — 25010000002 ONDANSETRON PER 1 MG: Performed by: NURSE ANESTHETIST, CERTIFIED REGISTERED

## 2021-08-12 PROCEDURE — 63710000001 PROMETHAZINE PER 25 MG: Performed by: ANESTHESIOLOGY

## 2021-08-12 PROCEDURE — 25010000002 PROMETHAZINE PER 50 MG: Performed by: INTERNAL MEDICINE

## 2021-08-12 PROCEDURE — C1889 IMPLANT/INSERT DEVICE, NOC: HCPCS | Performed by: ORTHOPAEDIC SURGERY

## 2021-08-12 PROCEDURE — 25010000002 FENTANYL CITRATE (PF) 50 MCG/ML SOLUTION: Performed by: NURSE ANESTHETIST, CERTIFIED REGISTERED

## 2021-08-12 PROCEDURE — 25010000002 ROPIVACAINE PER 1 MG: Performed by: ORTHOPAEDIC SURGERY

## 2021-08-12 PROCEDURE — 25010000003 MEPIVACAINE PER 10 ML: Performed by: ANESTHESIOLOGY

## 2021-08-12 PROCEDURE — 25010000002 HYDROMORPHONE PER 4 MG: Performed by: NURSE ANESTHETIST, CERTIFIED REGISTERED

## 2021-08-12 PROCEDURE — 73560 X-RAY EXAM OF KNEE 1 OR 2: CPT

## 2021-08-12 PROCEDURE — 82962 GLUCOSE BLOOD TEST: CPT

## 2021-08-12 PROCEDURE — 25010000002 ROPIVACINE HCL-NACL: Performed by: NURSE ANESTHETIST, CERTIFIED REGISTERED

## 2021-08-12 PROCEDURE — 25010000002 PROPOFOL 10 MG/ML EMULSION: Performed by: NURSE ANESTHETIST, CERTIFIED REGISTERED

## 2021-08-12 PROCEDURE — C1713 ANCHOR/SCREW BN/BN,TIS/BN: HCPCS | Performed by: ORTHOPAEDIC SURGERY

## 2021-08-12 DEVICE — 2FS 2-0 UNDYD MONODERM 30X30
Type: IMPLANTABLE DEVICE | Site: KNEE | Status: FUNCTIONAL
Brand: STRATAFIX SPIRAL

## 2021-08-12 DEVICE — LEGION NARROW POSTERIOR STABILIZED                                    OXINIUM SIZE 5N RIGHT
Type: IMPLANTABLE DEVICE | Site: KNEE | Status: FUNCTIONAL
Brand: LEGION

## 2021-08-12 DEVICE — LEGION POSTERIOR STABILIZED HIGH                                    FLEX HIGHLY CROSS LINKED                                    POLYETHYLENE SIZE 3-4 9MM
Type: IMPLANTABLE DEVICE | Site: KNEE | Status: FUNCTIONAL
Brand: LEGION

## 2021-08-12 DEVICE — GENESIS II RESURFACING PATELLAR                                    PROSTHESIS  32MM
Type: IMPLANTABLE DEVICE | Site: KNEE | Status: FUNCTIONAL
Brand: GENESIS II

## 2021-08-12 DEVICE — GENESIS II NON-POROUS TIBIAL                                    BASEPLATE SIZE 3 RIGHT
Type: IMPLANTABLE DEVICE | Site: KNEE | Status: FUNCTIONAL
Brand: GENESIS II

## 2021-08-12 DEVICE — IMPLANTABLE DEVICE: Type: IMPLANTABLE DEVICE | Site: KNEE | Status: FUNCTIONAL

## 2021-08-12 DEVICE — VIOLET ANTIBACTERIAL POLYDIOXANONE, KNOTLESS TISSUE CONTROL DEVICE
Type: IMPLANTABLE DEVICE | Site: KNEE | Status: FUNCTIONAL
Brand: STRATAFIX

## 2021-08-12 DEVICE — PALACOS® R IS A FAST-CURING, RADIOPAQUE, POLY(METHYL METHACRYLATE)-BASED BONE CEMENT.PALACOS ® R CONTAINS THE X-RAY CONTRAST MEDIUM ZIRCONIUM DIOXIDE. TO IMPROVE VISIBILITY IN THE SURGICAL FIELD PALACOS ® R HAS BEEN COLOURED WITH CHLOROPHYLL (E141). THE BONE CEMENT IS PREPARED DIRECTLY BEFORE USE BY MIXING A POLYMER POWDER COMPONENT WITH A LIQUID MONOMER COMPONENT. A DUCTILE DOUGH FORMS WHICH CURES WITHIN A FEW MINUTES.
Type: IMPLANTABLE DEVICE | Site: KNEE | Status: FUNCTIONAL
Brand: PALACOS®

## 2021-08-12 RX ORDER — ONDANSETRON 2 MG/ML
4 INJECTION INTRAMUSCULAR; INTRAVENOUS ONCE AS NEEDED
Status: COMPLETED | OUTPATIENT
Start: 2021-08-12 | End: 2021-08-12

## 2021-08-12 RX ORDER — OXYCODONE HYDROCHLORIDE 10 MG/1
5-10 TABLET ORAL EVERY 4 HOURS PRN
Qty: 15 TABLET | Refills: 0 | Status: SHIPPED | OUTPATIENT
Start: 2021-08-12 | End: 2022-03-22

## 2021-08-12 RX ORDER — ACETAMINOPHEN 325 MG/1
650 TABLET ORAL ONCE
Status: DISCONTINUED | OUTPATIENT
Start: 2021-08-12 | End: 2021-08-13 | Stop reason: HOSPADM

## 2021-08-12 RX ORDER — LIDOCAINE HYDROCHLORIDE 10 MG/ML
0.5 INJECTION, SOLUTION EPIDURAL; INFILTRATION; INTRACAUDAL; PERINEURAL ONCE AS NEEDED
Status: COMPLETED | OUTPATIENT
Start: 2021-08-12 | End: 2021-08-12

## 2021-08-12 RX ORDER — AMOXICILLIN 250 MG
2 CAPSULE ORAL 2 TIMES DAILY PRN
Status: DISCONTINUED | OUTPATIENT
Start: 2021-08-12 | End: 2021-08-13 | Stop reason: HOSPADM

## 2021-08-12 RX ORDER — PREGABALIN 75 MG/1
75 CAPSULE ORAL ONCE
Status: COMPLETED | OUTPATIENT
Start: 2021-08-12 | End: 2021-08-12

## 2021-08-12 RX ORDER — CLINDAMYCIN PHOSPHATE 900 MG/50ML
900 INJECTION INTRAVENOUS EVERY 8 HOURS
Status: COMPLETED | OUTPATIENT
Start: 2021-08-12 | End: 2021-08-13

## 2021-08-12 RX ORDER — ONDANSETRON 4 MG/1
4 TABLET, FILM COATED ORAL EVERY 6 HOURS PRN
Status: DISCONTINUED | OUTPATIENT
Start: 2021-08-12 | End: 2021-08-13 | Stop reason: HOSPADM

## 2021-08-12 RX ORDER — PROPOFOL 10 MG/ML
VIAL (ML) INTRAVENOUS AS NEEDED
Status: DISCONTINUED | OUTPATIENT
Start: 2021-08-12 | End: 2021-08-12 | Stop reason: SURG

## 2021-08-12 RX ORDER — FENTANYL CITRATE 50 UG/ML
50 INJECTION, SOLUTION INTRAMUSCULAR; INTRAVENOUS
Status: DISCONTINUED | OUTPATIENT
Start: 2021-08-12 | End: 2021-08-12 | Stop reason: HOSPADM

## 2021-08-12 RX ORDER — MIDAZOLAM HYDROCHLORIDE 1 MG/ML
0.5 INJECTION INTRAMUSCULAR; INTRAVENOUS
Status: DISCONTINUED | OUTPATIENT
Start: 2021-08-12 | End: 2021-08-12 | Stop reason: HOSPADM

## 2021-08-12 RX ORDER — ASPIRIN 325 MG
325 TABLET, DELAYED RELEASE (ENTERIC COATED) ORAL DAILY
Status: DISCONTINUED | OUTPATIENT
Start: 2021-08-13 | End: 2021-08-13 | Stop reason: HOSPADM

## 2021-08-12 RX ORDER — EPHEDRINE SULFATE 50 MG/ML
5 INJECTION, SOLUTION INTRAVENOUS ONCE AS NEEDED
Status: DISCONTINUED | OUTPATIENT
Start: 2021-08-12 | End: 2021-08-12 | Stop reason: HOSPADM

## 2021-08-12 RX ORDER — OXYCODONE HYDROCHLORIDE 5 MG/1
5 TABLET ORAL EVERY 4 HOURS PRN
Status: DISCONTINUED | OUTPATIENT
Start: 2021-08-12 | End: 2021-08-13 | Stop reason: HOSPADM

## 2021-08-12 RX ORDER — MELOXICAM 7.5 MG/1
7.5 TABLET ORAL ONCE AS NEEDED
Status: DISCONTINUED | OUTPATIENT
Start: 2021-08-12 | End: 2021-08-12 | Stop reason: HOSPADM

## 2021-08-12 RX ORDER — CLINDAMYCIN PHOSPHATE 900 MG/50ML
900 INJECTION INTRAVENOUS ONCE
Status: COMPLETED | OUTPATIENT
Start: 2021-08-12 | End: 2021-08-12

## 2021-08-12 RX ORDER — DOCUSATE SODIUM 100 MG/1
100 CAPSULE, LIQUID FILLED ORAL 2 TIMES DAILY PRN
Status: DISCONTINUED | OUTPATIENT
Start: 2021-08-12 | End: 2021-08-13 | Stop reason: HOSPADM

## 2021-08-12 RX ORDER — MAGNESIUM HYDROXIDE 1200 MG/15ML
LIQUID ORAL AS NEEDED
Status: DISCONTINUED | OUTPATIENT
Start: 2021-08-12 | End: 2021-08-12 | Stop reason: HOSPADM

## 2021-08-12 RX ORDER — ACETAMINOPHEN 500 MG
1000 TABLET ORAL ONCE
Status: COMPLETED | OUTPATIENT
Start: 2021-08-12 | End: 2021-08-12

## 2021-08-12 RX ORDER — ONDANSETRON 2 MG/ML
4 INJECTION INTRAMUSCULAR; INTRAVENOUS ONCE AS NEEDED
Status: DISCONTINUED | OUTPATIENT
Start: 2021-08-12 | End: 2021-08-13 | Stop reason: HOSPADM

## 2021-08-12 RX ORDER — NALOXONE HCL 0.4 MG/ML
0.4 VIAL (ML) INJECTION AS NEEDED
Status: DISCONTINUED | OUTPATIENT
Start: 2021-08-12 | End: 2021-08-12 | Stop reason: HOSPADM

## 2021-08-12 RX ORDER — FAMOTIDINE 20 MG/1
20 TABLET, FILM COATED ORAL ONCE
Status: COMPLETED | OUTPATIENT
Start: 2021-08-12 | End: 2021-08-12

## 2021-08-12 RX ORDER — ONDANSETRON 4 MG/1
4 TABLET, FILM COATED ORAL EVERY 8 HOURS PRN
Qty: 20 TABLET | Refills: 1 | Status: SHIPPED | OUTPATIENT
Start: 2021-08-12 | End: 2022-03-22

## 2021-08-12 RX ORDER — DIPHENHYDRAMINE HCL 25 MG
25 CAPSULE ORAL EVERY 6 HOURS PRN
Status: DISCONTINUED | OUTPATIENT
Start: 2021-08-12 | End: 2021-08-13 | Stop reason: HOSPADM

## 2021-08-12 RX ORDER — HYDROMORPHONE HYDROCHLORIDE 1 MG/ML
0.5 INJECTION, SOLUTION INTRAMUSCULAR; INTRAVENOUS; SUBCUTANEOUS
Status: DISCONTINUED | OUTPATIENT
Start: 2021-08-12 | End: 2021-08-13 | Stop reason: HOSPADM

## 2021-08-12 RX ORDER — ONDANSETRON 2 MG/ML
4 INJECTION INTRAMUSCULAR; INTRAVENOUS EVERY 6 HOURS PRN
Status: DISCONTINUED | OUTPATIENT
Start: 2021-08-12 | End: 2021-08-13 | Stop reason: HOSPADM

## 2021-08-12 RX ORDER — KETOROLAC TROMETHAMINE 15 MG/ML
15 INJECTION, SOLUTION INTRAMUSCULAR; INTRAVENOUS EVERY 6 HOURS PRN
Status: DISCONTINUED | OUTPATIENT
Start: 2021-08-12 | End: 2021-08-13 | Stop reason: HOSPADM

## 2021-08-12 RX ORDER — OXYCODONE HYDROCHLORIDE 5 MG/1
10 TABLET ORAL EVERY 4 HOURS PRN
Status: DISCONTINUED | OUTPATIENT
Start: 2021-08-12 | End: 2021-08-13 | Stop reason: HOSPADM

## 2021-08-12 RX ORDER — FLUTICASONE PROPIONATE 50 MCG
1 SPRAY, SUSPENSION (ML) NASAL DAILY
Status: DISCONTINUED | OUTPATIENT
Start: 2021-08-12 | End: 2021-08-13 | Stop reason: HOSPADM

## 2021-08-12 RX ORDER — NICOTINE POLACRILEX 4 MG
15 LOZENGE BUCCAL
Status: DISCONTINUED | OUTPATIENT
Start: 2021-08-12 | End: 2021-08-13 | Stop reason: HOSPADM

## 2021-08-12 RX ORDER — HYDRALAZINE HYDROCHLORIDE 20 MG/ML
10 INJECTION INTRAMUSCULAR; INTRAVENOUS EVERY 6 HOURS PRN
Status: DISCONTINUED | OUTPATIENT
Start: 2021-08-12 | End: 2021-08-13 | Stop reason: HOSPADM

## 2021-08-12 RX ORDER — SODIUM CHLORIDE 9 MG/ML
100 INJECTION, SOLUTION INTRAVENOUS CONTINUOUS
Status: DISCONTINUED | OUTPATIENT
Start: 2021-08-12 | End: 2021-08-13 | Stop reason: HOSPADM

## 2021-08-12 RX ORDER — DIPHENHYDRAMINE HYDROCHLORIDE 50 MG/ML
25 INJECTION INTRAMUSCULAR; INTRAVENOUS EVERY 6 HOURS PRN
Status: DISCONTINUED | OUTPATIENT
Start: 2021-08-12 | End: 2021-08-13 | Stop reason: HOSPADM

## 2021-08-12 RX ORDER — DEXTROSE MONOHYDRATE 25 G/50ML
25 INJECTION, SOLUTION INTRAVENOUS
Status: DISCONTINUED | OUTPATIENT
Start: 2021-08-12 | End: 2021-08-13 | Stop reason: HOSPADM

## 2021-08-12 RX ORDER — NALOXONE HCL 0.4 MG/ML
0.1 VIAL (ML) INJECTION
Status: DISCONTINUED | OUTPATIENT
Start: 2021-08-12 | End: 2021-08-13 | Stop reason: HOSPADM

## 2021-08-12 RX ORDER — BISACODYL 10 MG
10 SUPPOSITORY, RECTAL RECTAL DAILY PRN
Status: DISCONTINUED | OUTPATIENT
Start: 2021-08-12 | End: 2021-08-13 | Stop reason: HOSPADM

## 2021-08-12 RX ORDER — TRANEXAMIC ACID 10 MG/ML
1000 INJECTION, SOLUTION INTRAVENOUS ONCE
Status: COMPLETED | OUTPATIENT
Start: 2021-08-12 | End: 2021-08-12

## 2021-08-12 RX ORDER — OXYCODONE AND ACETAMINOPHEN 7.5; 325 MG/1; MG/1
1 TABLET ORAL ONCE AS NEEDED
Status: DISCONTINUED | OUTPATIENT
Start: 2021-08-12 | End: 2021-08-12 | Stop reason: HOSPADM

## 2021-08-12 RX ORDER — HYDROMORPHONE HYDROCHLORIDE 1 MG/ML
0.5 INJECTION, SOLUTION INTRAMUSCULAR; INTRAVENOUS; SUBCUTANEOUS
Status: DISCONTINUED | OUTPATIENT
Start: 2021-08-12 | End: 2021-08-12 | Stop reason: HOSPADM

## 2021-08-12 RX ORDER — DOCUSATE SODIUM 100 MG/1
100 CAPSULE, LIQUID FILLED ORAL 2 TIMES DAILY PRN
Qty: 20 CAPSULE | Refills: 1 | Status: SHIPPED | OUTPATIENT
Start: 2021-08-12 | End: 2022-03-22

## 2021-08-12 RX ORDER — METFORMIN HYDROCHLORIDE 500 MG/1
500 TABLET, EXTENDED RELEASE ORAL
Status: DISCONTINUED | OUTPATIENT
Start: 2021-08-13 | End: 2021-08-13 | Stop reason: HOSPADM

## 2021-08-12 RX ORDER — SODIUM CHLORIDE, SODIUM LACTATE, POTASSIUM CHLORIDE, CALCIUM CHLORIDE 600; 310; 30; 20 MG/100ML; MG/100ML; MG/100ML; MG/100ML
100 INJECTION, SOLUTION INTRAVENOUS CONTINUOUS
Status: DISCONTINUED | OUTPATIENT
Start: 2021-08-12 | End: 2021-08-13 | Stop reason: HOSPADM

## 2021-08-12 RX ORDER — SODIUM CHLORIDE, SODIUM LACTATE, POTASSIUM CHLORIDE, CALCIUM CHLORIDE 600; 310; 30; 20 MG/100ML; MG/100ML; MG/100ML; MG/100ML
9 INJECTION, SOLUTION INTRAVENOUS CONTINUOUS
Status: DISCONTINUED | OUTPATIENT
Start: 2021-08-12 | End: 2021-08-13 | Stop reason: HOSPADM

## 2021-08-12 RX ORDER — ONDANSETRON 4 MG/1
4 TABLET, FILM COATED ORAL ONCE AS NEEDED
Status: DISCONTINUED | OUTPATIENT
Start: 2021-08-12 | End: 2021-08-13 | Stop reason: HOSPADM

## 2021-08-12 RX ORDER — BUPIVACAINE HYDROCHLORIDE 2.5 MG/ML
INJECTION, SOLUTION EPIDURAL; INFILTRATION; INTRACAUDAL
Status: DISCONTINUED | OUTPATIENT
Start: 2021-08-12 | End: 2021-08-12 | Stop reason: SURG

## 2021-08-12 RX ORDER — DOCUSATE SODIUM 100 MG/1
100 CAPSULE, LIQUID FILLED ORAL ONCE
Status: DISCONTINUED | OUTPATIENT
Start: 2021-08-12 | End: 2021-08-13 | Stop reason: HOSPADM

## 2021-08-12 RX ORDER — ACETAMINOPHEN 500 MG
1000 TABLET ORAL EVERY 6 HOURS
Status: DISCONTINUED | OUTPATIENT
Start: 2021-08-12 | End: 2021-08-13 | Stop reason: HOSPADM

## 2021-08-12 RX ORDER — LIDOCAINE HYDROCHLORIDE 10 MG/ML
INJECTION, SOLUTION EPIDURAL; INFILTRATION; INTRACAUDAL; PERINEURAL AS NEEDED
Status: DISCONTINUED | OUTPATIENT
Start: 2021-08-12 | End: 2021-08-12 | Stop reason: SURG

## 2021-08-12 RX ORDER — ASPIRIN 325 MG
325 TABLET, DELAYED RELEASE (ENTERIC COATED) ORAL DAILY
Qty: 14 TABLET | Refills: 0 | Status: SHIPPED | OUTPATIENT
Start: 2021-08-13 | End: 2021-08-27

## 2021-08-12 RX ORDER — MEPERIDINE HYDROCHLORIDE 25 MG/ML
12.5 INJECTION INTRAMUSCULAR; INTRAVENOUS; SUBCUTANEOUS
Status: DISCONTINUED | OUTPATIENT
Start: 2021-08-12 | End: 2021-08-12 | Stop reason: HOSPADM

## 2021-08-12 RX ORDER — BISACODYL 5 MG/1
10 TABLET, DELAYED RELEASE ORAL DAILY PRN
Status: DISCONTINUED | OUTPATIENT
Start: 2021-08-12 | End: 2021-08-13 | Stop reason: HOSPADM

## 2021-08-12 RX ORDER — MELOXICAM 7.5 MG/1
15 TABLET ORAL DAILY
Status: DISCONTINUED | OUTPATIENT
Start: 2021-08-12 | End: 2021-08-13 | Stop reason: HOSPADM

## 2021-08-12 RX ORDER — LISINOPRIL 5 MG/1
2.5 TABLET ORAL DAILY
Status: DISCONTINUED | OUTPATIENT
Start: 2021-08-12 | End: 2021-08-13 | Stop reason: HOSPADM

## 2021-08-12 RX ORDER — PANTOPRAZOLE SODIUM 40 MG/1
40 TABLET, DELAYED RELEASE ORAL DAILY
Status: DISCONTINUED | OUTPATIENT
Start: 2021-08-12 | End: 2021-08-13 | Stop reason: HOSPADM

## 2021-08-12 RX ORDER — PROMETHAZINE HYDROCHLORIDE 25 MG/1
25 TABLET ORAL ONCE
Status: COMPLETED | OUTPATIENT
Start: 2021-08-12 | End: 2021-08-12

## 2021-08-12 RX ADMIN — LIDOCAINE HYDROCHLORIDE 0.5 ML: 10 INJECTION, SOLUTION EPIDURAL; INFILTRATION; INTRACAUDAL; PERINEURAL at 06:44

## 2021-08-12 RX ADMIN — TRANEXAMIC ACID 1000 MG: 10 INJECTION, SOLUTION INTRAVENOUS at 07:43

## 2021-08-12 RX ADMIN — LIDOCAINE HYDROCHLORIDE 2 ML: 10 INJECTION, SOLUTION EPIDURAL; INFILTRATION; INTRACAUDAL; PERINEURAL at 07:33

## 2021-08-12 RX ADMIN — BUPIVACAINE HYDROCHLORIDE 25 ML: 2.5 INJECTION, SOLUTION EPIDURAL; INFILTRATION; INTRACAUDAL; PERINEURAL at 09:15

## 2021-08-12 RX ADMIN — PROMETHAZINE HYDROCHLORIDE 6.25 MG: 25 INJECTION INTRAMUSCULAR; INTRAVENOUS at 16:25

## 2021-08-12 RX ADMIN — CLINDAMYCIN PHOSPHATE 900 MG: 900 INJECTION, SOLUTION INTRAVENOUS at 07:29

## 2021-08-12 RX ADMIN — MELOXICAM 15 MG: 7.5 TABLET ORAL at 19:58

## 2021-08-12 RX ADMIN — ROPIVACAINE HYDROCHLORIDE 10 ML/HR: 2 INJECTION, SOLUTION EPIDURAL; INFILTRATION at 09:23

## 2021-08-12 RX ADMIN — SODIUM CHLORIDE, POTASSIUM CHLORIDE, SODIUM LACTATE AND CALCIUM CHLORIDE 9 ML/HR: 600; 310; 30; 20 INJECTION, SOLUTION INTRAVENOUS at 06:44

## 2021-08-12 RX ADMIN — PROMETHAZINE HYDROCHLORIDE 25 MG: 25 TABLET ORAL at 15:48

## 2021-08-12 RX ADMIN — PANTOPRAZOLE SODIUM 40 MG: 40 TABLET, DELAYED RELEASE ORAL at 19:58

## 2021-08-12 RX ADMIN — ACETAMINOPHEN 1000 MG: 500 TABLET, FILM COATED ORAL at 19:58

## 2021-08-12 RX ADMIN — CLINDAMYCIN IN 5 PERCENT DEXTROSE 900 MG: 18 INJECTION, SOLUTION INTRAVENOUS at 19:57

## 2021-08-12 RX ADMIN — FENTANYL CITRATE 50 MCG: 50 INJECTION INTRAMUSCULAR; INTRAVENOUS at 12:47

## 2021-08-12 RX ADMIN — FAMOTIDINE 20 MG: 20 TABLET ORAL at 06:48

## 2021-08-12 RX ADMIN — ROPIVACAINE HYDROCHLORIDE 10 ML/HR: 2 INJECTION, SOLUTION EPIDURAL; INFILTRATION at 09:01

## 2021-08-12 RX ADMIN — SODIUM CHLORIDE, POTASSIUM CHLORIDE, SODIUM LACTATE AND CALCIUM CHLORIDE 100 ML/HR: 600; 310; 30; 20 INJECTION, SOLUTION INTRAVENOUS at 10:18

## 2021-08-12 RX ADMIN — TRANEXAMIC ACID 1000 MG: 10 INJECTION, SOLUTION INTRAVENOUS at 08:33

## 2021-08-12 RX ADMIN — ONDANSETRON 4 MG: 2 INJECTION INTRAMUSCULAR; INTRAVENOUS at 13:06

## 2021-08-12 RX ADMIN — PREGABALIN 75 MG: 75 CAPSULE ORAL at 06:48

## 2021-08-12 RX ADMIN — PROPOFOL 75 MCG/KG/MIN: 10 INJECTION, EMULSION INTRAVENOUS at 07:44

## 2021-08-12 RX ADMIN — HYDROMORPHONE HYDROCHLORIDE 0.5 MG: 1 INJECTION, SOLUTION INTRAMUSCULAR; INTRAVENOUS; SUBCUTANEOUS at 14:43

## 2021-08-12 RX ADMIN — MEPIVACAINE HYDROCHLORIDE 4 ML: 15 INJECTION, SOLUTION EPIDURAL; INFILTRATION at 07:39

## 2021-08-12 RX ADMIN — LISINOPRIL 2.5 MG: 5 TABLET ORAL at 19:58

## 2021-08-12 RX ADMIN — ACETAMINOPHEN 1000 MG: 500 TABLET, FILM COATED ORAL at 06:48

## 2021-08-12 RX ADMIN — FENTANYL CITRATE 50 MCG: 50 INJECTION INTRAMUSCULAR; INTRAVENOUS at 13:32

## 2021-08-12 RX ADMIN — PROPOFOL 50 MG: 10 INJECTION, EMULSION INTRAVENOUS at 07:33

## 2021-08-12 NOTE — ANESTHESIA PROCEDURE NOTES
Peripheral Block      Patient reassessed immediately prior to procedure    Patient location during procedure: post-op  Start time: 8/12/2021 9:15 AM  Stop time: 8/12/2021 9:25 AM  Reason for block: at surgeon's request and post-op pain management  Performed by  CRNA: Candido Hammer, CRNA  Assisted by: Jayashree Calabrese RN  Preanesthetic Checklist  Completed: patient identified, IV checked, site marked, risks and benefits discussed, surgical consent, monitors and equipment checked, pre-op evaluation and timeout performed  Prep:  Pt Position: supine  Sterile barriers:cap, gloves, mask and sterile barriers  Prep: ChloraPrep  Patient monitoring: blood pressure monitoring, continuous pulse oximetry and EKG  Procedure  Sedation:no  Performed under: spinal  Guidance:ultrasound guided  Images:still images obtained, printed/placed on chart    Laterality:right  Block Type:adductor canal block  Injection Technique:catheter  Needle Type:Tuohy and echogenic  Needle Gauge:18 G  Resistance on Injection: none  Catheter Size:20 G (20g)  Cath Depth at skin: 11 cm    Medications Used: bupivacaine PF (MARCAINE) 0.25 % injection, 25 mL  Med admintered at 8/12/2021 9:15 AM      Medications  Preservative Free Saline:5ml    Post Assessment  Injection Assessment: negative aspiration for heme, incremental injection and no paresthesia on injection  Patient Tolerance:comfortable throughout block  Complications:no  Additional Notes  Procedure:             The pt was placed in the Supine position.  The Insertion site was  prepped and Draped in sterile fashion.  The pt was anesthetized with  IV Sedation( see meds).  Skin and cutaneous tissue was infiltrated and anesthetized with 1% Lidocaine 3 mls via a 25g needle.  A BBraun 4 inch 18g echogenic needle was then  inserted approximately midline, mid-thigh and advanced In-plane with Ultrasound guidance.  Normal Saline PSF was utilized for hydrodissection of tissue.  The Vastus medialis and  Sartorius muscle where visualized and the needle tip was placed in the adductor canal,  lateral to the femoral artery.  LA injection spread was visualized, injection was incremental 1-5ml, injection pressure was normal or little, no intraneural injection, no vascular injection.  LA dose was injected thru the needle(see dose above).  A BBraun 20g wire stylet catheter was placed via the needle with ultrasound visualization and confirmation with NS fluid bolus. The catheter insertion site was sealed with exofin tissue adhesive. The labeled catheter was then coiled and secured to skin with benzoin,  steristrips and CHG transparent dressing.  Appropriate labels were applied.  Thank you.

## 2021-08-12 NOTE — CASE MANAGEMENT/SOCIAL WORK
Case Management Discharge Note      Final Note: I spoke with Shai, Mrs. Barrios' spouse on the phone regarding her discharge plan. Castro and Mrs. Barrios live in [facility]. Their adult daughter Dara lives with them. Mrs. Barrios has a rolling walker and bedside commode to use at home if needed. She will need Physical therapy after this admission. I called Ayde with [address] and faxed a referral to her at [phone]. They will visit Mrs. Barrios in her home for a few PT sessions and then transition her into outpatient PT in their clinic. This information has been placed in the AVS. They will contact her with her first appointment. Shai verbalized understanding. He will transport her home at the time of discharge. He denied having any additional discharge needs.         Selected Continued Care - Admitted Since 8/12/2021     Destination    No services have been selected for the patient.              Durable Medical Equipment    No services have been selected for the patient.              Dialysis/Infusion    No services have been selected for the patient.              Home Medical Care    No services have been selected for the patient.              Therapy Coordination complete.    Service Provider Selected Services Address Phone Fax Patient Preferred    [address]  Outpatient Physical Therapy [address] [place] [id number] [phone] --          Community Resources    No services have been selected for the patient.              Community & DME    No services have been selected for the patient.                  Transportation Services  Private: Car    Final Discharge Disposition Code: 01 - home or self-care

## 2021-08-12 NOTE — ANESTHESIA POSTPROCEDURE EVALUATION
Patient: Lorie Barrios    Procedure Summary     Date: 08/12/21 Room / Location:  [facility]  /  [facility]    Anesthesia Start: 0729 Anesthesia Stop:     Procedure: RIGHT TOTAL KNEE REPLACEMENT (Right Knee) Diagnosis:       Primary localized osteoarthritis of right knee      (right knee osteoarthritis)    Surgeons: Lul Banks MD Provider: Grzegorz Delacruz MD    Anesthesia Type: spinal, MAC ASA Status: 3          Anesthesia Type: spinal, MAC    Vitals  Vitals Value Taken Time   BP 87/45 08/12/21 0854   Temp     Pulse 89 08/12/21 0858   Resp     SpO2 94 % 08/12/21 0858   Vitals shown include unvalidated device data.        Post Anesthesia Care and Evaluation    Patient location during evaluation: PACU  Patient participation: complete - patient participated  Level of consciousness: awake and alert  Pain score: 0  Pain management: adequate  Airway patency: patent  Anesthetic complications: No anesthetic complications  PONV Status: none  Cardiovascular status: hemodynamically stable and acceptable  Respiratory status: nonlabored ventilation, acceptable and nasal cannula  Hydration status: acceptable

## 2021-08-12 NOTE — ANESTHESIA PREPROCEDURE EVALUATION
Anesthesia Evaluation     Patient summary reviewed and Nursing notes reviewed   history of anesthetic complications: PONV  NPO Solid Status: > 8 hours  NPO Liquid Status: > 8 hours           Airway   Mallampati: II  TM distance: >3 FB  Neck ROM: limited  No difficulty expected  Dental      Pulmonary    (-) COPD, asthma, shortness of breath, recent URI, sleep apnea, not a smoker  Cardiovascular   Exercise tolerance: good (4-7 METS)    ECG reviewed    (+) hypertension, hyperlipidemia,   (-) past MI, dysrhythmias (sp PVA tomassoni 6 year ago ), angina    ROS comment: ECG NSR     Neuro/Psych  (+) headaches,     (-) seizures, CVA    ROS Comment: DDDspine cervical thor  GI/Hepatic/Renal/Endo    (+) obesity,  GERD,  diabetes mellitus type 2 well controlled,   (-) no renal disease, no thyroid disorder    Musculoskeletal     Abdominal    Substance History      OB/GYN          Other   arthritis,      ROS/Med Hx Other: NO Anticoags thinners                Anesthesia Plan    ASA 3     spinal and MAC   (Propofol infusion as part of an anti PONV technique  ACB Cath post op)  intravenous induction     Anesthetic plan, all risks, benefits, and alternatives have been provided, discussed and informed consent has been obtained with: patient.    Plan discussed with CRNA.

## 2021-08-12 NOTE — H&P
Patient Care Team:      Chief complaint  Right knee pain    Subjective:    Patient is a 71 y.o.female presents with a history of right knee pain for over a year,  She had cortisone injection with no improvement.  She presents today for right knee replacement   Review of Systems:  General ROS: negative  Cardiovascular ROS: no chest pain or dyspnea on exertion  Previous ablation  Respiratory ROS: no cough, shortness of breath, or wheezing      Allergies:   Allergies   Allergen Reactions   • Levofloxacin Anaphylaxis   • Statins Myalgia     muscle pain   • Penicillins Rash          Latex: neg  Contrast Dye neg    Home Meds    Medications Prior to Admission   Medication Sig Dispense Refill Last Dose   • Bempedoic Acid (Nexletol) 180 MG tablet Take 1 tablet by mouth Daily for cholesterol 30 tablet 5 8/11/2021 at 2100   • lisinopril (PRINIVIL,ZESTRIL) 2.5 MG tablet Take 1 tablet by mouth once a day FOR BLOOD PRESSURE (Patient taking differently: Take 2.5 mg by mouth Daily.) 90 tablet 3 8/11/2021 at 2100   • metFORMIN ER (GLUCOPHAGE-XR) 500 MG 24 hr tablet Take 1 tablet by mouth Daily. (Patient taking differently: Take 500 mg by mouth Daily With Breakfast.) 90 tablet 3 8/11/2021 at 2100   • pantoprazole (PROTONIX) 40 MG EC tablet Take 1 tablet by mouth Daily. 90 tablet 3 8/11/2021 at 2100   • acetaminophen-codeine (TYLENOL #3) 300-30 MG per tablet Take 1 tablet by mouth three times a day as needed for pain (Patient taking differently: Take 1 tablet by mouth 3 (Three) Times a Day As Needed for Moderate Pain .) 30 tablet 0 7/22/2021   • EPINEPHrine (EPIPEN) 0.3 MG/0.3ML solution auto-injector injection Administer 1 pen injector intramuscularly single dose as needed for anaphylaxis.  Call 911 if used (Patient taking differently: Inject 0.3 mg under the skin into the appropriate area as directed 1 (One) Time.) 2 each 1 Unknown at Unknown time   • fluticasone (VERAMYST) 27.5 MCG/SPRAY nasal spray Spray 1-2 sprays into the  "nostril(s) once daily as directed by provider. (Patient taking differently: 1 spray into the nostril(s) as directed by provider Daily As Needed for Rhinitis or Allergies.) 5.9 mL 8 7/29/2021   • meloxicam (MOBIC) 15 MG tablet Take 1 tablet by mouth once a day as needed (Patient taking differently: Take 15 mg by mouth Daily.) 30 tablet 0 8/7/2021     PMH:   Past Medical History:   Diagnosis Date   • Arthritis    • Atrial fibrillation (CMS/HCC)    • DDD (degenerative disc disease), cervical    • DDD (degenerative disc disease), thoracic    • Diabetes mellitus (CMS/HCC)    • Dyslipidemia    • GERD (gastroesophageal reflux disease)     GERD/ Hiatal Hernia   • Hyperlipidemia    • Hypertension    • Macular degeneration    • Migraine     Migraine headaches   • PONV (postoperative nausea and vomiting)    • SVT (supraventricular tachycardia) (CMS/HCC)    • Tinnitus    • Wears glasses     for distance only     PSH:    Past Surgical History:   Procedure Laterality Date   • ABLATION OF DYSRHYTHMIC FOCUS      treatment for A-fib was successful   • APPENDECTOMY     • BLADDER SURGERY      bladder tack   • BREAST BIOPSY Right 1990    Ultrasound guided   • CARDIAC CATHETERIZATION     • HYSTERECTOMY  1991    Total w/BSO/complete   • KNEE ARTHROSCOPY Right 10/30/2020    Procedure: Right knee diagnostic arthroscopy partial lateral meniscectomy;  Surgeon: Robert Saeed MD;  [facility];  Service: Orthopedics;  Laterality: Right;   • REDUCTION MAMMAPLASTY Bilateral 1992   • TONSILLECTOMY     • VAGINAL DELIVERY      x2     Immunization History: pneumo up to date    Flu neg  Tetanus ? COVID one vaccine, has antibodies  Social History:   Tobacco neg   Alcohol neg      Physical Exam:/82 (BP Location: Right arm, Patient Position: Lying)   Pulse 84   Temp 98.2 °F (36.8 °C) (Temporal)   Resp 16   Ht 165.1 cm (65\")   Wt 98.9 kg (218 lb)   LMP  (LMP Unknown)   SpO2 97%   BMI 36.28 kg/m²       General Appearance:    Alert, " cooperative, no distress, appears stated age   Head:    Normocephalic, without obvious abnormality, atraumatic   Lungs:     Clear to auscultation bilaterally, respirations unlabored    Heart: Regular rate and rhythm, S1 and S2 normal, no murmur, rub    or gallop    Abdomen:    Soft without tenderness   Breast Exam:    deferred   Genitalia:    deferred   Extremities:   Extremities normal, atraumatic, no cyanosis or edema   Skin:   Skin color, texture, turgor normal, no rashes or lesions   Neurologic:   Grossly intact     Results Review:   LABS:  Lab Results   Component Value Date    WBC 8.01 08/05/2021    HGB 14.1 08/05/2021    HCT 43.5 08/05/2021    MCV 95.2 08/05/2021     08/05/2021    NEUTROABS 6.32 10/28/2020    GLUCOSE 98 08/05/2021    BUN 22 08/05/2021    CREATININE 0.83 08/05/2021    EGFRIFNONA 68 08/05/2021     08/05/2021    K 4.4 08/05/2021     08/05/2021    CO2 25.0 08/05/2021    CALCIUM 10.0 08/05/2021    ALBUMIN 3.80 11/02/2016    AST 17 11/02/2016    ALT 17 11/02/2016    BILITOT 0.4 11/02/2016       RADIOLOGY:  Imaging Results (Last 72 Hours)     ** No results found for the last 72 hours. **               Cancer Patient: __ yes __no __unknown; If yes, clinical stage T:__ N:__M:__, stage group    Impression: osteoarthritis right knee  Plan: right total knee arthroplasty  Marry Curry PA-C 8/12/2021 06:51 EDT

## 2021-08-12 NOTE — DISCHARGE PLACEMENT REQUEST
"Pavithra Cano (71 y.o. Female)     Date of Birth Social Security Number Address Home Phone MRN    1950  [address] [place] [address] [phone] [id number]    Pentecostal Marital Status          Rastafarian        Admission Date Admission Type Admitting Provider Attending Provider Department, Room/Bed    21 Elective Lul Banks MD Coy, Samuel Christopher, MD [facility] OR, [facility] OR/MAIN OR    Discharge Date Discharge Disposition Discharge Destination         Home or Self Care              Attending Provider: Lul Banks MD    Allergies: Levofloxacin, Statins, Penicillins    Isolation: None   Infection: None   Code Status: Not on file    Ht: 165.1 cm (65\")   Wt: 98.9 kg (218 lb)    Admission Cmt: None   Principal Problem: Status post total right knee replacement [Z96.651]                 Active Insurance as of 2021     Primary Coverage     Payor Plan Insurance Group Employer/Plan Group    ANTHEM BLUE CROSS ANTHEM BLUE CROSS BLUE [facility] PPO [id number]     Payor Plan Address Payor Plan Phone Number Payor Plan Fax Number Effective Dates    [address] [phone]  3/1/2016 - None Entered    [place] [address]       Subscriber Name Subscriber Birth Date Member ID       PAVITHRA CANO 1950 [id number]                 Emergency Contacts      (Rel.) Home Phone Work Phone Mobile Phone    Castro Cano (Spouse) [phone] -- [phone]            [address] [address]  Phone:  [phone]  Fax:  [phone] Date: Aug 12, 2021      Ambulatory Referral to Physical Therapy POST OP (right knee arthroplasty)     Patient:  Pavithra Cano MRN:  [id number]   [address] [place] [address] :  1950  SSN:    Phone: [phone] Sex:  F      INSURANCE PAYOR PLAN GROUP # SUBSCRIBER ID   Primary:    [employer] [id number] [id number]      Referring Provider Information:  OKSANA ELAM Phone: [phone] Fax: [phone]      Referral Information:   # Visits:  1 Referral Type: Physical Therapy [AE1]   Urgency:  Routine Referral Reason: Specialty Services Required   Start Date: Aug 12, 2021 End Date:  To be determined by Insurer   Diagnosis: Status post total right knee replacement (Z96.651 [ICD-10-CM] V43.65 [ICD-9-CM])      Refer to Dept:   Refer to Provider:   Refer to Facility:       Specialty needed: POST OP (right knee arthroplasty)     This document serves as a request of services and does not constitute Insurance authorization or approval of services.  To determine eligibility, please contact the members Insurance carrier to verify and review coverage.     If you have medical questions regarding this request for services. Please contact [facility] OR at [phone] during normal business hours.       Authorizing Provider:Oksana Elam MD  Authorizing Provider's NPI: [id number]  Order Entered By: Emre Larose RN 8/12/2021  3:43 PM     Electronically signed by: Oksana Elam MD 8/12/2021  3:43 PM

## 2021-08-12 NOTE — BRIEF OP NOTE
TOTAL KNEE ARTHROPLASTY  Progress Note    Lorie Barrios  8/12/2021    Pre-op Diagnosis:   right knee osteoarthritis       Post-Op Diagnosis Codes:     * Primary localized osteoarthritis of right knee [M17.11]    Procedure/CPT® Codes:  [place] TOTAL KNEE ARTHROPLASTY [95080]      Procedure(s):  RIGHT TOTAL KNEE REPLACEMENT    Surgeon(s):  Lul Banks MD     Assistant: ANA Spangler    Anesthesia: Spinal, local and adductor canal catheter    Staff:   Circulator: Taran Desir RN; Brigido Piper RN  Scrub Person: Katy Sequeira  Nursing Assistant: Renato Katz  Assistant: Candido Mahmood RNFA  Assistant: Candido Mahmood RNFA      Estimated Blood Loss: 75 mL    Urine Voided: * No values recorded between 8/12/2021  7:26 AM and 8/12/2021  8:52 AM *    Specimens:                None          Drains: * No LDAs found *    Findings: Right knee severe tricompartmental degenerative changes with mild valgus deformity    Complications: None    Implants: [employer] posterior stabilized total knee arthroplasty with a size 5 narrow femur, 3 tibia, 9 polyethylene and 32 patella    Tourniquet time: 51 minutes at 300 mmHg    Assistant: Candido Mahmood RNFA  was responsible for performing the following activities: Retraction, implantation of components and closure and their skilled assistance was necessary for the success of this case.    Lul Banks MD     Date: 8/12/2021  Time: 08:53 EDT

## 2021-08-12 NOTE — OP NOTE
Preoperative diagnosis:Right knee end stage osteoarthritis    Postoperative diagnosis: Right knee end stage osteoarthritis    Operative procedure: Right total knee arthroplasty    Surgeon: Dr. Christiano Banks    Assistant: ANA Spangler.  Necessary during the case for positioning of the patient, exposure, implantation of components and closure.    Anesthesia: Spinal with local and adductor canal catheter    Estimated blood loss: Minimal    Implants: [employer]hew Legion  posterior stabilized total knee arthroplasty size 5 narrow femur, 3 tibia, 32patella, 9 poly    Tourniquet time: 51 minutes at 300 mmHg    Indications for procedure: Tracy is a very pleasant 71-year-old female who has struggled with end-stage activity limiting right knee pain secondary to osteoarthritis.  X-rays in June revealed severe tricompartmental degenerative changes in a valgus knee with loss of medial and lateral joint space and marginal osteophyte formation.  They have failed exhaustive conservative treatment measures including cortisone injections, viscosupplementation injections in March and physical therapy.  She had a right knee scope in October in [place] with no relief.  She takes meloxicam and Tylenol for her knee arthritis pain.  After undergoing a shared decision-making process they agreed to proceed with right total knee arthroplasty.  Surgical consent form was signed.    Description of procedure: The patient was seen in the preoperative holding area and the right leg was signed to confirm the correct operative site.  They were seen by anesthesia.  They received Ancef 2 g IV prophylactic antibiotics within 1 hour of incision time.  They were brought back to the operating room.  Spinal was performed without difficulty and they were given sedation throughout the case.  Patient placed in the supine position and all of  bony prominences were well-padded.  A bump was placed underneath the right hip.  Nonsterile tourniquet was applied to the  thigh.  The right lower extremity was prepped and draped in the usual sterile fashion and timeout was performed to confirm right total knee arthroplasty for patient Tracy Barrios.    The right lower extremity was exsanguinated with an Esmarch.  Tourniquet was inflated to 300 mmHg.  With the knee flexed a midline incision was made with a 10 blade scalpel.  Adequate hemostasis maintained with Bovie electrocautery.  Full-thickness medial and lateral flaps were elevated.  Using a fresh 10 blade scalpel I made a medial parapatellar arthrotomy.  The patella was everted.  Patella fat pad and anterior femoral fat pads were excised.  Marginal osteophytes were removed.  A minimal medial release was performed for this valgus knee using Bovie electrocautery.  Copemish's line was marked.  Z retractors were placed medially and laterally.  The distal femur was then drilled and intramedullary distal femoral cutting guide was pinned in place set at a 5° valgus cut for a 9.5 mm cut.  This cut was then made with the oscillating saw.  The femur was then sized to a size 5 set at 3° of external rotation.  4-in-1 cutting guide was pinned in place.  Anterior and posterior cuts were made as were the chamfer cuts.  Cut bone was removed.  We then turned our attention to the tibia.    The tibia was subluxed anteriorly. PCL retractor was placed as were medial and lateral Hohmann retractors.  We then used the extramedullary tibial cutting guide set at 3° posterior slope for the proximal tibial cut.  5 mm of bone was removed from the low lateral side and a centimeter from the high medial side.  Medial and lateral menisci were then excised as were posterior osteophytes.  Flexion and extension gaps were then checked and with a 9 mm block we achieved full extension and flexion with excellent alignment. The tibia was sized to a 3 tibial tray centered off the medial third of the tibial tubercle.  The tray was pinned in place.  We then impacted the  tibial fins.  Size 5 trial femur was then placed and we made the box cut with the reamer and punch. We trialed with a size 9 poly, 5 femur and 3 tibia.  With these trial components in place we achieved full extension and flexion with excellent alignment and stable throughout.  Lug holes for the femur were drilled.    We then turned our attention to the patella.  Patella was sized to 24 mm in thickness and we made a 9 mm patellar cut with the reciprocal saw.  A 32 trial was placed and the patella drill holes were made.  With the trial button in place there was excellent tracking with the no thumbs technique.    Trial components were then removed.  Final components were opened on the back table.  Posterior capsule was injected with 20 cc of half percent Ropivicaine and 5 mg of morphine.  Cement was mixed on the back table.  The knee was copiously irrigated with Pulsavac lavage.  The knee was thoroughly dried and a bone plug was placed in the distal femoral drill hole.  Cement was then applied to the tibia and final size 3 tibial tray was impacted in place and excess cement was removed.  Cement was applied to the femur and final size 5 narrow femur was impacted in place and excess cement removed.  We then placed a 9 mm poly-this was seen to be fully seated in the tibial tray.  Knee was then placed in extension and we applied cement to the cut patellar surface and 32 patella was held in place with patellar clamp.  All excess cement was removed.  The knee was left in extension while cement cured.    Once the cement was fully cured we irrigated the knee with diluted betadine solution and Pulsavac lavage.  The knee was taken through full range of motion and seen to achieve full extension and flexion with excellent patellar tracking.    We then proceeded with closure.  The deep fascia was closed with a #1 Stratafix barbed suture.  Dermis was closed with 2-0 Vicryl.  Skin was closed with a running 3-0 Stratafix barbed  subcuticular suture.  A sterile dressing was then applied with Pirineo mesh dressing and Dermabond.  We then applied 4 x 4's, ABDs, soft roll and a six-inch Ace bandage.    Tourniquet was deflated at 51 minutes.  Patient was transferred to recovery in stable condition.  All sponge and needle counts were correct ×2.  Patient received first dose of TXA just prior to incision and the second dose 5-10 minutes prior to letting down the tourniquet to minimize bleeding.    Postoperative plan:  Patient will be discharged home from recovery if doing well  Mobilize starting today with PT/OT as tolerated  Start Aspirin for DVT prophylaxis tomorrow   consult for home physical therapy and home equipment needs  Follow-up with me in 2-3 weeks.    Lul Banks MD  08/12/21  08:54 EDT     CC: Dr. Zohra Gonzales

## 2021-08-12 NOTE — ANESTHESIA PROCEDURE NOTES
Spinal Block      Patient reassessed immediately prior to procedure    Patient location during procedure: OR  Indication:at surgeon's request  Performed By  CRNA: Uvaldo Vivar CRNA  Preanesthetic Checklist  Completed: patient identified, IV checked, site marked, risks and benefits discussed, surgical consent, monitors and equipment checked, pre-op evaluation and timeout performed  Spinal Block Prep:  Patient Position:sitting  Sterile Tech:cap, gloves, sterile barriers and mask  Prep:Chloraprep  Patient Monitoring:blood pressure monitoring, continuous pulse oximetry and EKG  Spinal Block Procedure  Approach:midline  Guidance:landmark technique and palpation technique  Location:L4-L5  Needle Type:Sprotte  Needle Gauge:22 G  Placement of Spinal needle event:cerebrospinal fluid aspirated  Paresthesia: no  Fluid Appearance:clear  Medications: mepivacaine (CARBOCAINE) 1.5 % injection, 4 mL  Med Administered at 8/12/2021 7:39 AM   Post Assessment  Patient Tolerance:patient tolerated the procedure well with no apparent complications  Complications no  Additional Notes  Procedure:  Pt assisted to sitting position, with legs in position of comfort over side of bed.  Pt. instructed in optimal spine presentation, the spine was prepped/ Draped and the skin at insertion site was anesthetized with 1% Lidocaine 2 ml.  The spinal needle was then advanced until CSF flow was obtained and LA was injected:

## 2021-08-13 ENCOUNTER — APPOINTMENT (OUTPATIENT)
Dept: GENERAL RADIOLOGY | Facility: HOSPITAL | Age: 71
End: 2021-08-13

## 2021-08-13 VITALS
TEMPERATURE: 98 F | DIASTOLIC BLOOD PRESSURE: 54 MMHG | RESPIRATION RATE: 16 BRPM | BODY MASS INDEX: 36.32 KG/M2 | OXYGEN SATURATION: 96 % | HEIGHT: 65 IN | SYSTOLIC BLOOD PRESSURE: 105 MMHG | WEIGHT: 218 LBS | HEART RATE: 73 BPM

## 2021-08-13 LAB
ANION GAP SERPL CALCULATED.3IONS-SCNC: 11 MMOL/L (ref 5–15)
BASOPHILS # BLD AUTO: 0.04 10*3/MM3 (ref 0–0.2)
BASOPHILS NFR BLD AUTO: 0.3 % (ref 0–1.5)
BUN SERPL-MCNC: 14 MG/DL (ref 8–23)
BUN/CREAT SERPL: 17.7 (ref 7–25)
CALCIUM SPEC-SCNC: 9 MG/DL (ref 8.6–10.5)
CHLORIDE SERPL-SCNC: 102 MMOL/L (ref 98–107)
CO2 SERPL-SCNC: 25 MMOL/L (ref 22–29)
CREAT SERPL-MCNC: 0.79 MG/DL (ref 0.57–1)
DEPRECATED RDW RBC AUTO: 44.1 FL (ref 37–54)
EOSINOPHIL # BLD AUTO: 0.03 10*3/MM3 (ref 0–0.4)
EOSINOPHIL NFR BLD AUTO: 0.2 % (ref 0.3–6.2)
ERYTHROCYTE [DISTWIDTH] IN BLOOD BY AUTOMATED COUNT: 12.9 % (ref 12.3–15.4)
GFR SERPL CREATININE-BSD FRML MDRD: 72 ML/MIN/1.73
GLUCOSE BLDC GLUCOMTR-MCNC: 161 MG/DL (ref 70–130)
GLUCOSE BLDC GLUCOMTR-MCNC: 88 MG/DL (ref 70–130)
GLUCOSE SERPL-MCNC: 112 MG/DL (ref 65–99)
HCT VFR BLD AUTO: 37.7 % (ref 34–46.6)
HGB BLD-MCNC: 12.4 G/DL (ref 12–15.9)
IMM GRANULOCYTES # BLD AUTO: 0.05 10*3/MM3 (ref 0–0.05)
IMM GRANULOCYTES NFR BLD AUTO: 0.4 % (ref 0–0.5)
LYMPHOCYTES # BLD AUTO: 2.34 10*3/MM3 (ref 0.7–3.1)
LYMPHOCYTES NFR BLD AUTO: 18.7 % (ref 19.6–45.3)
MCH RBC QN AUTO: 30.9 PG (ref 26.6–33)
MCHC RBC AUTO-ENTMCNC: 32.9 G/DL (ref 31.5–35.7)
MCV RBC AUTO: 94 FL (ref 79–97)
MONOCYTES # BLD AUTO: 0.98 10*3/MM3 (ref 0.1–0.9)
MONOCYTES NFR BLD AUTO: 7.8 % (ref 5–12)
NEUTROPHILS NFR BLD AUTO: 72.6 % (ref 42.7–76)
NEUTROPHILS NFR BLD AUTO: 9.06 10*3/MM3 (ref 1.7–7)
NRBC BLD AUTO-RTO: 0 /100 WBC (ref 0–0.2)
PLATELET # BLD AUTO: 285 10*3/MM3 (ref 140–450)
PMV BLD AUTO: 9.1 FL (ref 6–12)
POTASSIUM SERPL-SCNC: 4.1 MMOL/L (ref 3.5–5.2)
RBC # BLD AUTO: 4.01 10*6/MM3 (ref 3.77–5.28)
SODIUM SERPL-SCNC: 138 MMOL/L (ref 136–145)
WBC # BLD AUTO: 12.5 10*3/MM3 (ref 3.4–10.8)

## 2021-08-13 PROCEDURE — 97535 SELF CARE MNGMENT TRAINING: CPT

## 2021-08-13 PROCEDURE — 97116 GAIT TRAINING THERAPY: CPT

## 2021-08-13 PROCEDURE — 82962 GLUCOSE BLOOD TEST: CPT

## 2021-08-13 PROCEDURE — 97161 PT EVAL LOW COMPLEX 20 MIN: CPT

## 2021-08-13 PROCEDURE — 80048 BASIC METABOLIC PNL TOTAL CA: CPT | Performed by: ORTHOPAEDIC SURGERY

## 2021-08-13 PROCEDURE — 85025 COMPLETE CBC W/AUTO DIFF WBC: CPT | Performed by: ORTHOPAEDIC SURGERY

## 2021-08-13 PROCEDURE — 97530 THERAPEUTIC ACTIVITIES: CPT

## 2021-08-13 PROCEDURE — 71045 X-RAY EXAM CHEST 1 VIEW: CPT

## 2021-08-13 PROCEDURE — 97165 OT EVAL LOW COMPLEX 30 MIN: CPT

## 2021-08-13 PROCEDURE — 63710000001 INSULIN LISPRO (HUMAN) PER 5 UNITS: Performed by: INTERNAL MEDICINE

## 2021-08-13 RX ADMIN — OXYCODONE 10 MG: 5 TABLET ORAL at 08:08

## 2021-08-13 RX ADMIN — OXYCODONE 10 MG: 5 TABLET ORAL at 01:22

## 2021-08-13 RX ADMIN — FLUTICASONE PROPIONATE 1 SPRAY: 50 SPRAY, METERED NASAL at 08:07

## 2021-08-13 RX ADMIN — LISINOPRIL 2.5 MG: 5 TABLET ORAL at 08:07

## 2021-08-13 RX ADMIN — ACETAMINOPHEN 1000 MG: 500 TABLET, FILM COATED ORAL at 14:12

## 2021-08-13 RX ADMIN — OXYCODONE 10 MG: 5 TABLET ORAL at 14:13

## 2021-08-13 RX ADMIN — MELOXICAM 15 MG: 7.5 TABLET ORAL at 08:07

## 2021-08-13 RX ADMIN — ACETAMINOPHEN 1000 MG: 500 TABLET, FILM COATED ORAL at 08:08

## 2021-08-13 RX ADMIN — DOCUSATE SODIUM 100 MG: 100 CAPSULE, LIQUID FILLED ORAL at 08:08

## 2021-08-13 RX ADMIN — CLINDAMYCIN IN 5 PERCENT DEXTROSE 900 MG: 18 INJECTION, SOLUTION INTRAVENOUS at 04:35

## 2021-08-13 RX ADMIN — ACETAMINOPHEN 1000 MG: 500 TABLET, FILM COATED ORAL at 01:19

## 2021-08-13 RX ADMIN — INSULIN LISPRO 2 UNITS: 100 INJECTION, SOLUTION INTRAVENOUS; SUBCUTANEOUS at 13:03

## 2021-08-13 RX ADMIN — ASPIRIN 325 MG: 325 TABLET, COATED ORAL at 08:07

## 2021-08-13 RX ADMIN — PANTOPRAZOLE SODIUM 40 MG: 40 TABLET, DELAYED RELEASE ORAL at 08:11

## 2021-08-13 NOTE — PROGRESS NOTES
[facility]    Acute pain service Inpatient Progress Note    Patient Name: Lorie Barrios  :  1950  MRN:  [id number]        Acute Pain  Service Inpatient Progress Note:    Analgesia:Good  Pain Score:7/10  LOC: alert and awake  Cardiovascular status: bp low with ambulation.  Side Effects:None  Catheter Site:clean, dry and dressing intact  Cath type: peripheral nerve cath with ON Q  Infusion rate: 10ml/hr  Dosing/Volume: ropivacaine 0.2% (10ml)  Catheter Plan:Catheter to remain Insitu and Continue catheter infusion rate unchanged

## 2021-08-13 NOTE — DISCHARGE INSTRUCTIONS
You have a prineo dressing in place. This dressing is waterproof and you may shower with it in place. It will remain in place for up to 2 weeks and does not need to be covered by another dressing.     You have a nerve catheter in place to assist with pain control. Please call the phone number provided by anesthesiology with any questions, problems, or concerns regarding your nerve catheter.     [address] will provide you with in home physical therapy that will transition to outpatient as you are able. You can reach them at [phone] to set up your first appointment.

## 2021-08-13 NOTE — DISCHARGE SUMMARY
Patient Name: Lorie Barrios  MRN: [id number]  : 1950  DOS: 2021    Attending: Lul Banks,*    Primary Care Provider: Zohra Gonzales MD    Date of Admission:.2021  5:57 AM    Date of Discharge:  2021    Discharge Diagnosis:   Status post total right knee replacement    History of total right knee replacement      Hospital Course  Patient is a 71 y.o.female presents with a history of right knee pain for over a year,  She had cortisone injection with no improvement.  She presents today for right knee replacement   Post op she had some decrease in )2 saturation, improve with addition of 2l of O2, pt denied SOA or CP, she is on Metformin for Pre Diabetes.  She was unable to ambulate with PT due to persistent spinal block!  Next day she is doing well, pain is controlled, ambulated with PT    Procedures Performed  Procedure(s):  TOTAL KNEE REPLACEMENT RIGHT    Goal: Plan of Care Review  Recent Flowsheet Documentation  Taken 2021 0853 by Debo Fritz OT  Plan of Care Reviewed With: patient  Outcome Summary: OT IE completed. Pt is alert, Ox4 and participates with encourgement. Up in room/to chair with Min Ax2 using RW. Requires cues and assist for hand placement, sequencing, and safety. Acute hypotension with EOB/OOB activity.  BUE AROM, strength and sensation WFL. Education/demonstration completed for ADL retraining. Pt reports spouse and daughter available to assist with ADLs at d/c as needed. No DME needs. OT will d/c at this time and defer mobility/transfer concerns to IP PT. Recommend home with family assist when medically ready.            Pertinent Test Results:    I reviewed the patient's new clinical results.   Results from last 7 days   Lab Units 21  0534   WBC 10*3/mm3 12.50*   HEMOGLOBIN g/dL 12.4   HEMATOCRIT % 37.7   PLATELETS 10*3/mm3 285     Results from last 7 days   Lab Units 21  0534   SODIUM mmol/L 138   POTASSIUM mmol/L 4.1  "  CHLORIDE mmol/L 102   CO2 mmol/L 25.0   BUN mg/dL 14   CREATININE mg/dL 0.79   CALCIUM mg/dL 9.0   GLUCOSE mg/dL 112*     I reviewed the patient's new imaging including images and reports.          Physical therapy    Discharge Assessment:    Vital Signs  Visit Vitals  BP 96/69   Pulse 75   Temp 98.2 °F (36.8 °C) (Oral)   Resp 16   Ht 165.1 cm (65\")   Wt 98.9 kg (218 lb)   LMP  (LMP Unknown)   SpO2 100%   BMI 36.28 kg/m²     Temp (24hrs), Av °F (36.7 °C), Min:97.2 °F (36.2 °C), Max:98.5 °F (36.9 °C)      General Appearance:    Alert, cooperative, in no acute distress   Lungs:     Clear to auscultation,respirations regular, even and                   unlabored    Heart:    Regular rhythm and normal rate, normal S1 and S2, no            murmur, no gallop, no rub, no click   Abdomen:     Normal bowel sounds, no masses, no organomegaly, soft        non-tender, non-distended, no guarding, no rebound                 tenderness   Extremities:   Moves all extremities well, no edema, no cyanosis, no              redness   Pulses:   Pulses palpable and equal bilaterally   Skin:   No bleeding, bruising or rash   Neurologic:   Cranial nerves 2 - 12 grossly intact, sensation intact, DTR        present and equal bilaterally       Discharge Disposition: home    Discharge Medications     Discharge Medications      New Medications      Instructions Start Date   aspirin  MG tablet   325 mg, Oral, Daily      ondansetron 4 MG tablet  Commonly known as: Zofran   4 mg, Oral, Every 8 Hours PRN      oxyCODONE 10 MG tablet  Commonly known as: ROXICODONE   Take 0.5-1 tablet by mouth Every 4 (Four) Hours As Needed for Moderate Pain .  (NOTE NEW DOSE = 1/2-1 TABLET OF 10 MG)      Stool Softener Laxative 100 MG capsule  Generic drug: docusate sodium   100 mg, Oral, 2 Times Daily PRN         Changes to Medications      Instructions Start Date   acetaminophen-codeine 300-30 MG per tablet  Commonly known as: TYLENOL #3  What changed: " reasons to take this   Take 1 tablet by mouth three times a day as needed for pain      EPINEPHrine 0.3 MG/0.3ML solution auto-injector injection  Commonly known as: EPIPEN  What changed:   · how much to take  · how to take this  · when to take this   Administer 1 pen injector intramuscularly single dose as needed for anaphylaxis.  Call 911 if used      fluticasone 27.5 MCG/SPRAY nasal spray  Commonly known as: VERAMYST  What changed:   · how much to take  · when to take this  · reasons to take this   Spray 1-2 sprays into the nostril(s) once daily as directed by provider.      lisinopril 2.5 MG tablet  Commonly known as: PRINIVILZESTRIL  What changed:   · how much to take  · how to take this  · when to take this   Take 1 tablet by mouth once a day FOR BLOOD PRESSURE      meloxicam 15 MG tablet  Commonly known as: MOBIC  What changed:   · how much to take  · how to take this  · when to take this   Take 1 tablet by mouth once a day as needed      metFORMIN  MG 24 hr tablet  Commonly known as: GLUCOPHAGE-XR  What changed: when to take this   500 mg, Oral, Daily         Continue These Medications      Instructions Start Date   Nexletol 180 MG tablet  Generic drug: Bempedoic Acid   Take 1 tablet by mouth Daily for cholesterol      pantoprazole 40 MG EC tablet  Commonly known as: PROTONIX   40 mg, Oral, Daily             Discharge Diet:   Diet Instructions     Advance Diet as Tolerated            Activity at Discharge:   Activity Instructions     Patient May Shower; No Tub Baths, Pools or Hot Tubs      Okay to shower with mesh dressing in place once nerve catheter removed    Weight Bearing As Tolerated            Follow-up Appointments  Next follow-up appointment    Per ortho    -Aspirin for DVT prophylaxis  -Okay to shower with mesh dressing in place once nerve catheter removed  -Plan discharge home today.  Home physical therapy with [facility] transitioning into outpatient therapy there  -Follow-up in about 2  zoë Melara MD  08/13/21  11:50 EDT

## 2021-08-13 NOTE — PLAN OF CARE
Problem: Adult Inpatient Plan of Care  Goal: Plan of Care Review  Outcome: Met  Goal: Patient-Specific Goal (Individualized)  Outcome: Met  Goal: Absence of Hospital-Acquired Illness or Injury  Outcome: Met  Intervention: Identify and Manage Fall Risk  Recent Flowsheet Documentation  Taken 8/13/2021 1611 by Damaris Cuello RN  Safety Promotion/Fall Prevention:   activity supervised   elopement precautions   fall prevention program maintained   assistive device/personal items within reach   clutter free environment maintained   gait belt   nonskid shoes/slippers when out of bed   room organization consistent   safety round/check completed  Taken 8/13/2021 1412 by Damaris Cuello RN  Safety Promotion/Fall Prevention:   activity supervised   assistive device/personal items within reach   clutter free environment maintained   elopement precautions   fall prevention program maintained   gait belt   nonskid shoes/slippers when out of bed   room organization consistent   safety round/check completed  Taken 8/13/2021 1259 by Damaris Cuello RN  Safety Promotion/Fall Prevention:   activity supervised   assistive device/personal items within reach   clutter free environment maintained   elopement precautions   fall prevention program maintained   gait belt   nonskid shoes/slippers when out of bed   room organization consistent   safety round/check completed  Taken 8/13/2021 1009 by Damaris Cuello RN  Safety Promotion/Fall Prevention:   activity supervised   assistive device/personal items within reach   elopement precautions   fall prevention program maintained   clutter free environment maintained   gait belt   nonskid shoes/slippers when out of bed   room organization consistent   safety round/check completed  Taken 8/13/2021 0807 by Damaris Cuello RN  Safety Promotion/Fall Prevention:   activity supervised   assistive device/personal items within reach   clutter free environment maintained   gait belt    elopement precautions   fall prevention program maintained   nonskid shoes/slippers when out of bed   room organization consistent   safety round/check completed  Intervention: Prevent Skin Injury  Recent Flowsheet Documentation  Taken 8/13/2021 1611 by Damaris Cuello RN  Body Position: (up in chair) supine, legs elevated  Skin Protection:   adhesive use limited   incontinence pads utilized   tubing/devices free from skin contact  Taken 8/13/2021 1412 by Damaris Cuello RN  Body Position: (up in chair) weight shift assistance provided  Skin Protection:   adhesive use limited   incontinence pads utilized   tubing/devices free from skin contact  Taken 8/13/2021 1259 by Damaris Cuello RN  Body Position: (up in chair) weight shift assistance provided  Skin Protection:   adhesive use limited   incontinence pads utilized   tubing/devices free from skin contact  Taken 8/13/2021 1009 by Damaris Cuello RN  Body Position: (up in chair) supine, legs elevated  Skin Protection:   adhesive use limited   incontinence pads utilized   tubing/devices free from skin contact  Taken 8/13/2021 0807 by Damaris Cuello RN  Body Position: (up in chair) supine, legs elevated  Skin Protection:   adhesive use limited   incontinence pads utilized   tubing/devices free from skin contact  Intervention: Prevent and Manage VTE (venous thromboembolism) Risk  Recent Flowsheet Documentation  Taken 8/13/2021 1611 by Damaris Cuello RN  VTE Prevention/Management: sequential compression devices off  Taken 8/13/2021 1412 by Damaris Cuello RN  VTE Prevention/Management: sequential compression devices off  Taken 8/13/2021 1259 by Damaris Cuello RN  VTE Prevention/Management:   bilateral   sequential compression devices on  Taken 8/13/2021 1009 by Damaris Cuello RN  VTE Prevention/Management:   bilateral   sequential compression devices on  Taken 8/13/2021 0807 by Damaris Cuello RN  VTE Prevention/Management:    bilateral   sequential compression devices on  Intervention: Prevent Infection  Recent Flowsheet Documentation  Taken 8/13/2021 1611 by Damaris Cuello RN  Infection Prevention: environmental surveillance performed  Taken 8/13/2021 1412 by Damaris Cuello RN  Infection Prevention: environmental surveillance performed  Taken 8/13/2021 1259 by Damaris Cuello RN  Infection Prevention: environmental surveillance performed  Taken 8/13/2021 1009 by Damaris Cuello RN  Infection Prevention: environmental surveillance performed  Taken 8/13/2021 0807 by Damaris Cuello RN  Infection Prevention: environmental surveillance performed  Goal: Optimal Comfort and Wellbeing  Outcome: Met  Intervention: Provide Person-Centered Care  Recent Flowsheet Documentation  Taken 8/13/2021 1611 by Damaris Cuello RN  Trust Relationship/Rapport: care explained  Taken 8/13/2021 1412 by Damaris Cuello RN  Trust Relationship/Rapport: care explained  Taken 8/13/2021 1259 by Damaris Cuello RN  Trust Relationship/Rapport: care explained  Taken 8/13/2021 1009 by Damaris Cuello RN  Trust Relationship/Rapport: care explained  Taken 8/13/2021 0807 by Damaris Cuello RN  Trust Relationship/Rapport: care explained  Goal: Readiness for Transition of Care  Outcome: Met     Problem: Bleeding (Knee Arthroplasty)  Goal: Absence of Bleeding  Outcome: Met     Problem: Bowel Elimination Impaired (Knee Arthroplasty)  Goal: Effective Bowel Elimination  Outcome: Met     Problem: Infection (Knee Arthroplasty)  Goal: Absence of Infection Signs and Symptoms  Outcome: Met     Problem: Joint Function Impaired (Knee Arthroplasty)  Goal: Optimal Functional Ability  Outcome: Met     Problem: Ongoing Anesthesia Effects (Knee Arthroplasty)  Goal: Anesthesia/Sedation Recovery  Outcome: Met  Intervention: Optimize Anesthesia Recovery  Recent Flowsheet Documentation  Taken 8/13/2021 1611 by Damaris Cuello RN  Safety Promotion/Fall  Prevention:   activity supervised   elopement precautions   fall prevention program maintained   assistive device/personal items within reach   clutter free environment maintained   gait belt   nonskid shoes/slippers when out of bed   room organization consistent   safety round/check completed  Taken 8/13/2021 1412 by Damaris Cuello RN  Safety Promotion/Fall Prevention:   activity supervised   assistive device/personal items within reach   clutter free environment maintained   elopement precautions   fall prevention program maintained   gait belt   nonskid shoes/slippers when out of bed   room organization consistent   safety round/check completed  Taken 8/13/2021 1259 by Damaris Cuello RN  Safety Promotion/Fall Prevention:   activity supervised   assistive device/personal items within reach   clutter free environment maintained   elopement precautions   fall prevention program maintained   gait belt   nonskid shoes/slippers when out of bed   room organization consistent   safety round/check completed  Taken 8/13/2021 1009 by Damaris Cuello RN  Safety Promotion/Fall Prevention:   activity supervised   assistive device/personal items within reach   elopement precautions   fall prevention program maintained   clutter free environment maintained   gait belt   nonskid shoes/slippers when out of bed   room organization consistent   safety round/check completed  Taken 8/13/2021 0807 by Damaris Cuello RN  Safety Promotion/Fall Prevention:   activity supervised   assistive device/personal items within reach   clutter free environment maintained   gait belt   elopement precautions   fall prevention program maintained   nonskid shoes/slippers when out of bed   room organization consistent   safety round/check completed     Problem: Postoperative Nausea and Vomiting (Knee Arthroplasty)  Goal: Nausea and Vomiting Relief  Outcome: Met     Problem: Pain (Knee Arthroplasty)  Goal: Acceptable Pain Control  Outcome:  Met  Intervention: Prevent or Manage Pain  Recent Flowsheet Documentation  Taken 8/13/2021 1611 by Damaris Cuello RN  Diversional Activities: television  Taken 8/13/2021 1412 by Damaris Cuello RN  Pain Management Interventions: see MAR  Diversional Activities: television  Taken 8/13/2021 1259 by Damaris Cuello RN  Diversional Activities: television  Taken 8/13/2021 1009 by Damaris Cuello RN  Diversional Activities: television  Taken 8/13/2021 0807 by Damaris Cuello RN  Pain Management Interventions: see MAR  Diversional Activities: television     Problem: Postoperative Urinary Retention (Knee Arthroplasty)  Goal: Effective Urinary Elimination  Outcome: Met     Problem: Fall Injury Risk  Goal: Absence of Fall and Fall-Related Injury  Outcome: Met  Intervention: Identify and Manage Contributors to Fall Injury Risk  Recent Flowsheet Documentation  Taken 8/13/2021 0807 by Damaris Cuello RN  Medication Review/Management: medications reviewed  Intervention: Promote Injury-Free Environment  Recent Flowsheet Documentation  Taken 8/13/2021 1611 by Damaris Cuello RN  Safety Promotion/Fall Prevention:   activity supervised   elopement precautions   fall prevention program maintained   assistive device/personal items within reach   clutter free environment maintained   gait belt   nonskid shoes/slippers when out of bed   room organization consistent   safety round/check completed  Taken 8/13/2021 1412 by Damaris Cuello RN  Safety Promotion/Fall Prevention:   activity supervised   assistive device/personal items within reach   clutter free environment maintained   elopement precautions   fall prevention program maintained   gait belt   nonskid shoes/slippers when out of bed   room organization consistent   safety round/check completed  Taken 8/13/2021 1259 by Damaris Cuello RN  Safety Promotion/Fall Prevention:   activity supervised   assistive device/personal items within reach   clutter free  environment maintained   elopement precautions   fall prevention program maintained   gait belt   nonskid shoes/slippers when out of bed   room organization consistent   safety round/check completed  Taken 8/13/2021 1009 by Damaris Cuello, RN  Safety Promotion/Fall Prevention:   activity supervised   assistive device/personal items within reach   elopement precautions   fall prevention program maintained   clutter free environment maintained   gait belt   nonskid shoes/slippers when out of bed   room organization consistent   safety round/check completed  Taken 8/13/2021 0807 by Damaris Cuello, RN  Safety Promotion/Fall Prevention:   activity supervised   assistive device/personal items within reach   clutter free environment maintained   gait belt   elopement precautions   fall prevention program maintained   nonskid shoes/slippers when out of bed   room organization consistent   safety round/check completed   Goal Outcome Evaluation:

## 2021-08-13 NOTE — PROGRESS NOTES
"/72 (BP Location: Left arm, Patient Position: Lying)   Pulse 75   Temp 98.2 °F (36.8 °C) (Oral)   Resp 16   Ht 165.1 cm (65\")   Wt 98.9 kg (218 lb)   LMP  (LMP Unknown)   SpO2 100%   BMI 36.28 kg/m²     Lab Results (last 24 hours)     Procedure Component Value Units Date/Time    Basic Metabolic Panel [[id number]]  (Abnormal) Collected: 08/13/21 0534    Specimen: Blood Updated: 08/13/21 0709     Glucose 112 mg/dL      BUN 14 mg/dL      Creatinine 0.79 mg/dL      Sodium 138 mmol/L      Potassium 4.1 mmol/L      Chloride 102 mmol/L      CO2 25.0 mmol/L      Calcium 9.0 mg/dL      eGFR Non African Amer 72 mL/min/1.73      BUN/Creatinine Ratio 17.7     Anion Gap 11.0 mmol/L     Narrative:      GFR Normal >60  Chronic Kidney Disease <60  Kidney Failure <15      CBC & Differential [[id number]]  (Abnormal) Collected: 08/13/21 0534    Specimen: Blood Updated: 08/13/21 0632    Narrative:      The following orders were created for panel order CBC & Differential.  Procedure                               Abnormality         Status                     ---------                               -----------         ------                     CBC Auto Differential[[id number]]        Abnormal            Final result                 Please view results for these tests on the individual orders.    CBC Auto Differential [[id number]]  (Abnormal) Collected: 08/13/21 0534    Specimen: Blood Updated: 08/13/21 0632     WBC 12.50 10*3/mm3      RBC 4.01 10*6/mm3      Hemoglobin 12.4 g/dL      Hematocrit 37.7 %      MCV 94.0 fL      MCH 30.9 pg      MCHC 32.9 g/dL      RDW 12.9 %      RDW-SD 44.1 fl      MPV 9.1 fL      Platelets 285 10*3/mm3      Neutrophil % 72.6 %      Lymphocyte % 18.7 %      Monocyte % 7.8 %      Eosinophil % 0.2 %      Basophil % 0.3 %      Immature Grans % 0.4 %      Neutrophils, Absolute 9.06 10*3/mm3      Lymphocytes, Absolute 2.34 10*3/mm3      Monocytes, Absolute 0.98 10*3/mm3      Eosinophils, Absolute 0.03 " 10*3/mm3      Basophils, Absolute 0.04 10*3/mm3      Immature Grans, Absolute 0.05 10*3/mm3      nRBC 0.0 /100 WBC           Imaging Results (Last 24 Hours)     Procedure Component Value Units Date/Time    XR Knee 1 or 2 View Right [[id number]] Collected: 08/12/21 1050     Updated: 08/12/21 2116    Narrative:      EXAMINATION: XR KNEE 1 OR 2 VW RIGHT-     INDICATION: Status post surgery; Z96.651-Presence of right artificial  knee joint.      COMPARISON: None.     FINDINGS: Total right knee prosthesis is seen in anatomic alignment. No  abnormal lucency is seen around the prosthetic components. No fracture  or avulsion is identified. There is expected postop soft tissue air.       Impression:      Total right knee prosthesis in anatomic alignment.      D:  08/12/2021  E:  08/12/2021     This report was finalized on 8/12/2021 9:13 PM by Dr. Kyle Ramirez MD.             Patient Care Team:  Zohra Gonzales MD as PCP - General  Zohra Gonzales MD as PCP - Family Medicine    SUBJECTIVE: She reports she is doing well.  She was unable to go home and work with therapy yesterday as her foot was still numb.  This has resolved.  She anticipates being able to go home today.    PHYSICAL EXAM  Right knee incision is clean, dry and intact with mesh dressing in place  Thigh and calf soft and nontender  Neurovascular intact distally       Status post total right knee replacement    History of total right knee replacement      PLAN / DISPOSITION: 71-year-old female postop day #1 status post right total knee arthroplasty  -Foot numbness has resolved.  Mobilize with therapy as tolerated.  -Aspirin for DVT prophylaxis  -Okay to shower with mesh dressing in place once nerve catheter removed  -Plan discharge home today.  Home physical therapy with [facility] transitioning into outpatient therapy there  -Follow-up in about 2 weeks    Lul Banks MD  08/13/21  07:58 EDT

## 2021-08-13 NOTE — H&P
Patient Name: Lorie Barrios  MRN: [id number]  : 1950  DOS: 2021    Attending: Lul Banks,*    Primary Care Provider: Zohra Gonzales MD      Chief complaint:  Rt knee pain    Subjective   Patient is a 71 y.o.female presents with a history of right knee pain for over a year,  She had cortisone injection with no improvement.  She presents today for right knee replacement   Post op she had some decrease in )2 saturation, improve with addition of 2l of O2, pt denied SOA or CP, she is on Metformin for Pre Diabetes.  She was unable to ambulate with PT due to persistent spinal block!    Allergies:  Allergies   Allergen Reactions   • Levofloxacin Anaphylaxis   • Statins Myalgia     muscle pain   • Penicillins Rash       Meds:  Medications Prior to Admission   Medication Sig Dispense Refill Last Dose   • Bempedoic Acid (Nexletol) 180 MG tablet Take 1 tablet by mouth Daily for cholesterol 30 tablet 5 2021 at 2100   • lisinopril (PRINIVIL,ZESTRIL) 2.5 MG tablet Take 1 tablet by mouth once a day FOR BLOOD PRESSURE (Patient taking differently: Take 2.5 mg by mouth Daily.) 90 tablet 3 2021 at 2100   • metFORMIN ER (GLUCOPHAGE-XR) 500 MG 24 hr tablet Take 1 tablet by mouth Daily. (Patient taking differently: Take 500 mg by mouth Daily With Breakfast.) 90 tablet 3 2021 at 2100   • pantoprazole (PROTONIX) 40 MG EC tablet Take 1 tablet by mouth Daily. 90 tablet 3 2021 at 2100   • acetaminophen-codeine (TYLENOL #3) 300-30 MG per tablet Take 1 tablet by mouth three times a day as needed for pain (Patient taking differently: Take 1 tablet by mouth 3 (Three) Times a Day As Needed for Moderate Pain .) 30 tablet 0 2021   • EPINEPHrine (EPIPEN) 0.3 MG/0.3ML solution auto-injector injection Administer 1 pen injector intramuscularly single dose as needed for anaphylaxis.  Call 911 if used (Patient taking differently: Inject 0.3 mg under the skin into the appropriate area as  directed 1 (One) Time.) 2 each 1 Unknown at Unknown time   • fluticasone (VERAMYST) 27.5 MCG/SPRAY nasal spray Spray 1-2 sprays into the nostril(s) once daily as directed by provider. (Patient taking differently: 1 spray into the nostril(s) as directed by provider Daily As Needed for Rhinitis or Allergies.) 5.9 mL 8 7/29/2021   • meloxicam (MOBIC) 15 MG tablet Take 1 tablet by mouth once a day as needed (Patient taking differently: Take 15 mg by mouth Daily.) 30 tablet 0 8/7/2021         History:   Past Medical History:   Diagnosis Date   • Arthritis    • Atrial fibrillation (CMS/HCC)    • DDD (degenerative disc disease), cervical    • DDD (degenerative disc disease), thoracic    • Diabetes mellitus (CMS/HCC)    • Dyslipidemia    • GERD (gastroesophageal reflux disease)     GERD/ Hiatal Hernia   • Hyperlipidemia    • Hypertension    • Macular degeneration    • Migraine     Migraine headaches   • PONV (postoperative nausea and vomiting)    • SVT (supraventricular tachycardia) (CMS/HCC)    • Tinnitus    • Wears glasses     for distance only     Past Surgical History:   Procedure Laterality Date   • ABLATION OF DYSRHYTHMIC FOCUS      treatment for A-fib was successful   • APPENDECTOMY     • BLADDER SURGERY      bladder tack   • BREAST BIOPSY Right 1990    Ultrasound guided   • CARDIAC CATHETERIZATION     • HYSTERECTOMY  1991    Total w/BSO/complete   • KNEE ARTHROSCOPY Right 10/30/2020    Procedure: Right knee diagnostic arthroscopy partial lateral meniscectomy;  Surgeon: Robert Saeed MD;  Location: [facility];  Service: Orthopedics;  Laterality: Right;   • REDUCTION MAMMAPLASTY Bilateral 1992   • TONSILLECTOMY     • VAGINAL DELIVERY      x2     Family History   Problem Relation Age of Onset   • Breast cancer Mother 50   • Cancer Mother         breast   • Hypertension Mother    • Heart attack Father    • Coronary artery disease Father    • Cancer Maternal Grandmother         Ovarian     Social History     Tobacco  "Use   • Smoking status: Never Smoker   • Smokeless tobacco: Never Used   Vaping Use   • Vaping Use: Never used   Substance Use Topics   • Alcohol use: No   • Drug use: No       Review of Systems  Review of Systems  General: No Fever/chills  Allergy:No Rash  Respiratory: :Patient denies cough, chest pain, dyspnea, wheezing or hemoptysis  Cardiovascular: Patient denies chest pain, palpitations, irregular heart beat,, dyspnea, orthopnea and swelling\"}  Skin:No Rash or pruritis,   GI: No Nausea,vomiting or diarrhea  : No Dysuria    Vital Signs  /83 (BP Location: Left arm, Patient Position: Lying)   Pulse 83   Temp 98 °F (36.7 °C) (Oral)   Resp 16   Ht 165.1 cm (65\")   Wt 98.9 kg (218 lb)   LMP  (LMP Unknown)   SpO2 96%   BMI 36.28 kg/m²     Physical Exam:    General Appearance:    Alert, cooperative, in no acute distress   Head:    Normocephalic, without obvious abnormality, atraumatic   Eyes:            Lids and lashes normal, conjunctivae and sclerae normal, no   icterus, no pallor, corneas clear, PERRLA   Ears:    Ears appear intact with no abnormalities noted   Throat:   No oral lesions, no thrush, oral mucosa moist   Neck:   No adenopathy, supple, trachea midline, no thyromegaly, no     carotid bruit, no JVD   Back:     No kyphosis present, no scoliosis present, no skin lesions,       erythema or scars, no tenderness to percussion or                   palpation,   range of motion normal   Lungs:     Clear to auscultation,respirations regular, even and                   unlabored    Heart:    Regular rhythm and normal rate, normal S1 and S2, no            murmur, no gallop, no rub, no click   Abdomen:     Normal bowel sounds, no masses, no organomegaly, soft        non-tender, non-distended, no guarding, no rebound                 tenderness   Genitalia:    Deferred   Extremities:   Moves all extremities well, no edema, no cyanosis, no              redness   Pulses:   Pulses palpable and equal " bilaterally   Skin:   No bleeding, bruising or rash   Neurologic:   Cranial nerves 2 - 12 grossly intact, sensation intact, DTR        present and equal bilaterally      I reviewed the patient's new clinical results.             Invalid input(s): NEUTOPHILPCT,  EOSPCT        Invalid input(s): LABALBU, PROT  Lab Results   Component Value Date    HGBA1C 5.80 (H) 08/05/2021           Assessment and Plan:       Status post total right knee replacement    History of total right knee replacement  HTN  Insulin resistance  Obesity    Plan  1. PT/OT  2. Pain control-prns   3. IS-encourage  4. DVT proph-   5. Bowel regimen  6. Resume home medications as appropriate  7. Monitor post-op labs  8. DC planning for home.    Deejay Melara MD  08/12/21  22:24 EDT

## 2021-08-13 NOTE — PLAN OF CARE
Problem: Adult Inpatient Plan of Care  Goal: Plan of Care Review  Recent Flowsheet Documentation  Taken 8/13/2021 0853 by Debo Fritz OT  Plan of Care Reviewed With: patient  Outcome Summary: OT IE completed. Pt is alert, Ox4 and participates with encourgement. Up in room/to chair with Min Ax2 using RW. Requires cues and assist for hand placement, sequencing, and safety. Acute hypotension with EOB/OOB activity.  BUE AROM, strength and sensation WFL. Education/demonstration completed for ADL retraining. Pt reports spouse and daughter available to assist with ADLs at d/c as needed. No DME needs. OT will d/c at this time and defer mobility/transfer concerns to IP PT. Recommend home with family assist when medically ready.

## 2021-08-13 NOTE — THERAPY DISCHARGE NOTE
Acute Care - Occupational Therapy Discharge  [facility]    Patient Name: Lorie Barrios  : 1950    MRN: [id number]                              Today's Date: 2021       Admit Date: 2021    Visit Dx:     ICD-10-CM ICD-9-CM   1. History of total right knee replacement  Z96.651 V43.65   2. Status post total right knee replacement  Z96.651 V43.65     Patient Active Problem List   Diagnosis   • SVT (supraventricular tachycardia) (CMS/HCC)   • Hypertension   • Dyslipidemia   • Diabetes mellitus (CMS/HCC)   • GERD (gastroesophageal reflux disease)   • Migraine   • Arthritis   • Atrial tachycardia (CMS/HCC)   • Preop examination   • Arthritis of knee   • Status post total right knee replacement   • History of total right knee replacement     Past Medical History:   Diagnosis Date   • Arthritis    • Atrial fibrillation (CMS/HCC)    • DDD (degenerative disc disease), cervical    • DDD (degenerative disc disease), thoracic    • Diabetes mellitus (CMS/HCC)    • Dyslipidemia    • GERD (gastroesophageal reflux disease)     GERD/ Hiatal Hernia   • Hyperlipidemia    • Hypertension    • Macular degeneration    • Migraine     Migraine headaches   • PONV (postoperative nausea and vomiting)    • SVT (supraventricular tachycardia) (CMS/HCC)    • Tinnitus    • Wears glasses     for distance only     Past Surgical History:   Procedure Laterality Date   • ABLATION OF DYSRHYTHMIC FOCUS      treatment for A-fib was successful   • APPENDECTOMY     • BLADDER SURGERY      bladder tack   • BREAST BIOPSY Right     Ultrasound guided   • CARDIAC CATHETERIZATION     • HYSTERECTOMY      Total w/BSO/complete   • KNEE ARTHROSCOPY Right 10/30/2020    Procedure: Right knee diagnostic arthroscopy partial lateral meniscectomy;  Surgeon: Robert Saeed MD;  Location: [facility];  Service: Orthopedics;  Laterality: Right;   • REDUCTION MAMMAPLASTY Bilateral    • TONSILLECTOMY     • VAGINAL DELIVERY      x2     General  Information     Row Name 08/13/21 0839          OT Time and Intention    Document Type  discharge evaluation/summary  -TB     Mode of Treatment  occupational therapy;individual therapy  -TB     Row Name 08/13/21 0839          General Information    Patient Profile Reviewed  yes  -TB     Prior Level of Function  min assist:;all household mobility;community mobility;transfer;bed mobility;ADL's Increased pain and effort with all activities  -TB     Existing Precautions/Restrictions  fall;other (see comments) R adductor canal block, monitor BP - acute hypotension with first OOB  -TB     Barriers to Rehab  previous functional deficit  -TB     Row Name 08/13/21 0839          Occupational Profile    Reason for Services/Referral (Occupational Profile)  to advance occupational engagement  -TB     Row Name 08/13/21 0839          Living Environment    Lives With  spouse  -TB     Row Name 08/13/21 0839          Home Main Entrance    Number of Stairs, Main Entrance  one  -TB     Row Name 08/13/21 0839          Stairs Within Home, Primary    Number of Stairs, Within Home, Primary  none  -TB     Row Name 08/13/21 0839          Cognition    Orientation Status (Cognition)  oriented x 4  -TB     Row Name 08/13/21 0839          Safety Issues, Functional Mobility    Safety Issues Affecting Function (Mobility)  safety precautions follow-through/compliance;safety precaution awareness;insight into deficits/self-awareness;positioning of assistive device;sequencing abilities  -TB     Impairments Affecting Function (Mobility)  balance;endurance/activity tolerance;pain;strength;range of motion (ROM);postural/trunk control  -TB     Comment, Safety Issues/Impairments (Mobility)  Pt up with RW support and Min Ax2. Education, cues and assist for RW positioning and safety.  -TB       User Key  (r) = Recorded By, (t) = Taken By, (c) = Cosigned By    Initials Name Provider Type    TB Debo Fritz, OT Occupational Therapist         Mobility/ADL's     Row Name 08/13/21 0845          Bed Mobility    Bed Mobility  supine-sit;scooting/bridging  -TB     Scooting/Bridging [place] (Bed Mobility)  minimum assist (75% patient effort);verbal cues  -TB     Supine-Sit [place] (Bed Mobility)  moderate assist (50% patient effort);verbal cues  -TB     Bed Mobility, Safety Issues  decreased use of legs for bridging/pushing  -TB     Assistive Device (Bed Mobility)  leg ;bed rails  -TB     Comment (Bed Mobility)  AE issued and teaching completed. Pt demonstrates fair understanding. Reports high bed surface at home. PT notified.  -TB     Row Name 08/13/21 0845          Transfers    Transfers  sit-stand transfer;bed-chair transfer  -TB     Comment (Transfers)  Education and cues for hand placement, sequencing, and safety.  -TB     Bed-Chair [place] (Transfers)  minimum assist (75% patient effort);2 person assist;verbal cues  -TB     Assistive Device (Bed-Chair Transfers)  walker, front-wheeled  -TB     Sit-Stand [place] (Transfers)  minimum assist (75% patient effort);2 person assist;verbal cues  -TB     Row Name 08/13/21 0845          Sit-Stand Transfer    Assistive Device (Sit-Stand Transfers)  walker, front-wheeled  -TB     Row Name 08/13/21 0845          Functional Mobility    Functional Mobility- Ind. Level  minimum assist (75% patient effort);2 person assist required  -TB     Functional Mobility- Device  rolling walker  -TB     Functional Mobility-Distance (Feet)  3  -TB     Functional Mobility- Safety Issues  balance decreased during turns;sequencing ability decreased;step length decreased;weight-shifting ability decreased  -TB     Functional Mobility- Comment  Defer distance to PT  -TB     Row Name 08/13/21 0845          Activities of Daily Living    BADL Assessment/Intervention  bathing;upper body dressing;lower body dressing;grooming;feeding  -TB     Row Name 08/13/21 0845          Mobility    Extremity Weight-bearing  Status  right lower extremity  -TB     Right Lower Extremity (Weight-bearing Status)  weight-bearing as tolerated (WBAT)  -TB     Row Name 08/13/21 0845          Bathing Assessment/Intervention    [place] Level (Bathing)  set up;distal lower extremities/feet  -TB     Assistive Devices (Bathing)  long-handled sponge  -TB     Position (Bathing)  supported sitting  -TB     Comment (Bathing)  Education/demonstration completed for ADL retraining. Pt reports spouse and daughter available to assist at d/c as needed.  -TB     Row Name 08/13/21 0845          Upper Body Dressing Assessment/Training    [place] Level (Upper Body Dressing)  don;pajama/robe;minimum assist (75% patient effort)  -TB     Position (Upper Body Dressing)  edge of bed sitting  -TB     Comment (Upper Body Dressing)  assist for lines only  -TB     Row Name 08/13/21 0845          Lower Body Dressing Assessment/Training    [place] Level (Lower Body Dressing)  lower body dressing skills;maximum assist (25% patient effort);verbal cues  -TB     Position (Lower Body Dressing)  supported sitting  -TB     Comment (Lower Body Dressing)  Education/demonstration completed for ADL retraining. Pt reports spouse and daughter available to assist at d/c as needed. Teaching completed for adductor canal line mgmt.  -TB     Row Name 08/13/21 0845          Grooming Assessment/Training    [place] Level (Grooming)  set up;wash face, hands;hair care, combing/brushing  -TB     Position (Grooming)  supported sitting  -TB     Row Name 08/13/21 0845          Self-Feeding Assessment/Training    [place] Level (Feeding)  set up;liquids to mouth  -TB     Position (Self-Feeding)  supported sitting  -TB       User Key  (r) = Recorded By, (t) = Taken By, (c) = Cosigned By    Initials Name Provider Type    TB Debo Fritz OT Occupational Therapist        Obj/Interventions     Row Name 08/13/21 0835          Sensory Assessment (Somatosensory)    Sensory  Assessment (Somatosensory)  UE sensation intact  -TB     Row Name 08/13/21 0851          Vision Assessment/Intervention    Visual Impairment/Limitations  corrective lenses for reading  -TB     Row Name 08/13/21 0851          Range of Motion Comprehensive    General Range of Motion  bilateral upper extremity ROM WFL  -TB     Row Name 08/13/21 0851          Strength Comprehensive (MMT)    Comment, General Manual Muscle Testing (MMT) Assessment  Generalized weakness/deconditioned. BUE WFL for AD use and ADL performance.  -TB     Row Name 08/13/21 0851          Balance    Balance Assessment  sitting dynamic balance;sit to stand dynamic balance;standing dynamic balance  -     Dynamic Sitting Balance  WFL;unsupported;sitting in chair;sitting, edge of bed  -     Sit to Stand Dynamic Balance  mild impairment;supported;standing  -     Dynamic Standing Balance  mild impairment;supported;standing  -TB     Balance Interventions  sitting;standing;sit to stand;supported;dynamic;occupation based/functional task;weight shifting activity;UE activity with balance activity  -     Comment, Balance  Pt up with RW support and Min Ax2. Requires cues and assist for RW positioning and safety.  -       User Key  (r) = Recorded By, (t) = Taken By, (c) = Cosigned By    Initials Name Provider Type    TB Debo Fritz, OT Occupational Therapist        Goals/Plan    No documentation.       Clinical Impression     Row Name 08/13/21 0853          Pain Assessment    Additional Documentation  Pain Scale: FACES Pre/Post-Treatment (Group)  -TB     Row Name 08/13/21 0853          Pain Scale: Numbers Pre/Post-Treatment    Pain Intervention(s)  Ambulation/increased activity;Repositioned;Cold applied;Other (Comment) R adductor canal block  -TB     Row Name 08/13/21 0853          Pain Scale: FACES Pre/Post-Treatment    Pain: FACES Scale, Pretreatment  4-->hurts little more  -TB     Posttreatment Pain Rating  6-->hurts even more  -TB      Pain Location - Side  Right  -TB     Pain Location - Orientation  anterior  -TB     Pain Location  knee  -TB     Pre/Posttreatment Pain Comment  Pt tolerates limited OOB activity  -TB     Row Name 08/13/21 0853          Plan of Care Review    Plan of Care Reviewed With  patient  -TB     Outcome Summary  OT IE compelted. Pt is alert, Ox4 and participates with encourgement. Up in room/to chair with Min Ax2 using RW. Requires cues and assist for hand placement, sequencing, and safety. Acute hypotension with EOB/OOB activity.  BUE AROM, strength and sensation WFL. Education/demonstration completed for ADL retraining. Pt reports spouse and daughter available to assist at d/c as needed. No DME needs. OT will d/c at this time and defer mobility/transfer concerns to IP PT. Recommend home with family assist when medically ready.  -TB     Row Name 08/13/21 0853          Therapy Assessment/Plan (OT)    Therapy Frequency (OT)  evaluation only  -TB     Row Name 08/13/21 0853          Therapy Plan Review/Discharge Plan (OT)    Equipment Needs Upon Discharge (OT)  -- None. Pt has large walk-in shower with bench and raised commode in place  -TB     Anticipated Discharge Disposition (OT)  home with assist;home with home health  -TB     Row Name 08/13/21 0853          Vital Signs    Pre Systolic BP Rehab  -- RN cleared OT  -TB     Intra Systolic BP Rehab  82  -TB     Intra Treatment Diastolic BP  51  -TB     Post Systolic BP Rehab  96  -TB     Post Treatment Diastolic BP  69 RN notified with prompt follow-up  -TB     Pre SpO2 (%)  97 1L  -TB     O2 Delivery Pre Treatment  nasal cannula  -TB     Intra SpO2 (%)  95  -TB     O2 Delivery Intra Treatment  room air  -TB     Post SpO2 (%)  93  -TB     O2 Delivery Post Treatment  room air  -TB     Pre Patient Position  Supine  -TB     Intra Patient Position  Standing  -TB     Post Patient Position  Sitting  -TB     Row Name 08/13/21 0853          Positioning and Restraints    Pre-Treatment  Position  in bed  -TB     Post Treatment Position  chair  -TB     In Chair  reclined;call light within reach;encouraged to call for assist;exit alarm on;legs elevated  -TB       User Key  (r) = Recorded By, (t) = Taken By, (c) = Cosigned By    Initials Name Provider Type    Debo Hernandez OT Occupational Therapist        Outcome Measures     Row Name 08/13/21 0859          How much help from another is currently needed...    Putting on and taking off regular lower body clothing?  2  -TB     Bathing (including washing, rinsing, and drying)  2  -TB     Toileting (which includes using toilet bed pan or urinal)  2  -TB     Putting on and taking off regular upper body clothing  4  -TB     Taking care of personal grooming (such as brushing teeth)  3  -TB     Eating meals  4  -TB     AM-PAC 6 Clicks Score (OT)  17  -TB     Row Name 08/13/21 0807          How much help from another person do you currently need...    Turning from your back to your side while in flat bed without using bedrails?  4  -KN     Moving from lying on back to sitting on the side of a flat bed without bedrails?  3  -KN     Moving to and from a bed to a chair (including a wheelchair)?  2  -KN     Standing up from a chair using your arms (e.g., wheelchair, bedside chair)?  2  -KN     Climbing 3-5 steps with a railing?  2  -KN     To walk in hospital room?  2  -KN     AM-PAC 6 Clicks Score (PT)  15  -KN     Row Name 08/13/21 0859          Functional Assessment    Outcome Measure Options  AM-PAC 6 Clicks Daily Activity (OT)  -TB       User Key  (r) = Recorded By, (t) = Taken By, (c) = Cosigned By    Initials Name Provider Type    Debo Hernandez OT Occupational Therapist    Damaris Spence, RN Registered Nurse        Occupational Therapy Education                 Title: PT OT SLP Therapies (Done)     Topic: Occupational Therapy (Done)     Point: ADL training (Done)     Description:   Instruct learner(s) on proper safety  adaptation and remediation techniques during self care or transfers.   Instruct in proper use of assistive devices.              Learning Progress Summary           Patient Acceptance, E,D, VU by TB at 8/13/2021 0900    Comment: Education/demonstration completed for ADL retraining. Pt reports spouse and daughter available to assist at d/c as needed.                   Point: Precautions (Done)     Description:   Instruct learner(s) on prescribed precautions during self-care and functional transfers.              Learning Progress Summary           Patient Acceptance, E,D, VU by TB at 8/13/2021 0900    Comment: Education/demonstration completed for ADL retraining. Pt reports spouse and daughter available to assist at d/c as needed.                               User Key     Initials Effective Dates Name Provider Type Discipline     06/16/21 -  Debo Fritz, OT Occupational Therapist OT              OT Recommendation and Plan  Retired Outcome Summary/Treatment Plan (OT)  Anticipated Discharge Disposition (OT): home with assist, home with home health  Therapy Frequency (OT): evaluation only  Plan of Care Review  Plan of Care Reviewed With: patient  Outcome Summary: OT IE compelted. Pt is alert, Ox4 and participates with encourgement. Up in room/to chair with Min Ax2 using RW. Requires cues and assist for hand placement, sequencing, and safety. Acute hypotension with EOB/OOB activity.  BUE AROM, strength and sensation WFL. Education/demonstration completed for ADL retraining. Pt reports spouse and daughter available to assist at d/c as needed. No DME needs. OT will d/c at this time and defer mobility/transfer concerns to IP PT. Recommend home with family assist when medically ready.  Plan of Care Reviewed With: patient  Outcome Summary: OT IE compelted. Pt is alert, Ox4 and participates with encourgement. Up in room/to chair with Min Ax2 using RW. Requires cues and assist for hand placement, sequencing, and  safety. Acute hypotension with EOB/OOB activity.  BUE AROM, strength and sensation WFL. Education/demonstration completed for ADL retraining. Pt reports spouse and daughter available to assist at d/c as needed. No DME needs. OT will d/c at this time and defer mobility/transfer concerns to IP PT. Recommend home with family assist when medically ready.     Time Calculation:   Time Calculation- OT     Row Name 08/13/21 0750             Time Calculation- OT    OT Start Time  0750  -TB      OT Received On  08/13/21  -TB         Timed Charges    17118 - OT Self Care/Mgmt Minutes  15  -TB         Total Minutes    Timed Charges Total Minutes  15  -TB       Total Minutes  15  -TB        User Key  (r) = Recorded By, (t) = Taken By, (c) = Cosigned By    Initials Name Provider Type    TB Debo Fritz OT Occupational Therapist        Therapy Charges for Today     Code Description Service Date Service Provider Modifiers Qty    [id number]  OT SELF CARE/MGMT/TRAIN EA 15 MIN 8/13/2021 Debo Fritz OT GO 1    [id number]  OT EVAL LOW COMPLEXITY 4 8/13/2021 Debo Fritz OT GO 1               Debo Fritz OT  8/13/2021

## 2021-08-13 NOTE — THERAPY EVALUATION
Patient Name: Lorie Barrios  : 1950    MRN: [id number]                              Today's Date: 2021       Admit Date: 2021    Visit Dx:     ICD-10-CM ICD-9-CM   1. History of total right knee replacement  Z96.651 V43.65   2. Status post total right knee replacement  Z96.651 V43.65     Patient Active Problem List   Diagnosis   • SVT (supraventricular tachycardia) (CMS/HCC)   • Hypertension   • Dyslipidemia   • Diabetes mellitus (CMS/HCC)   • GERD (gastroesophageal reflux disease)   • Migraine   • Arthritis   • Atrial tachycardia (CMS/HCC)   • Preop examination   • Arthritis of knee   • Status post total right knee replacement   • History of total right knee replacement     Past Medical History:   Diagnosis Date   • Arthritis    • Atrial fibrillation (CMS/HCC)    • DDD (degenerative disc disease), cervical    • DDD (degenerative disc disease), thoracic    • Diabetes mellitus (CMS/HCC)    • Dyslipidemia    • GERD (gastroesophageal reflux disease)     GERD/ Hiatal Hernia   • Hyperlipidemia    • Hypertension    • Macular degeneration    • Migraine     Migraine headaches   • PONV (postoperative nausea and vomiting)    • SVT (supraventricular tachycardia) (CMS/HCC)    • Tinnitus    • Wears glasses     for distance only     Past Surgical History:   Procedure Laterality Date   • ABLATION OF DYSRHYTHMIC FOCUS      treatment for A-fib was successful   • APPENDECTOMY     • BLADDER SURGERY      bladder tack   • BREAST BIOPSY Right     Ultrasound guided   • CARDIAC CATHETERIZATION     • HYSTERECTOMY      Total w/BSO/complete   • KNEE ARTHROSCOPY Right 10/30/2020    Procedure: Right knee diagnostic arthroscopy partial lateral meniscectomy;  Surgeon: Robert Saeed MD;  Location: [facility];  Service: Orthopedics;  Laterality: Right;   • REDUCTION MAMMAPLASTY Bilateral    • TONSILLECTOMY     • TOTAL KNEE ARTHROPLASTY Right 2021    Procedure: TOTAL KNEE REPLACEMENT RIGHT;  Surgeon:  Lul Banks MD;  Location: [facility];  Service: Orthopedics;  Laterality: Right;   • VAGINAL DELIVERY      x2     General Information     Row Name 08/13/21 1316          Physical Therapy Time and Intention    Document Type  evaluation  -LO     Mode of Treatment  individual therapy;physical therapy  -LO     Row Name 08/13/21 1316          General Information    Patient Profile Reviewed  yes  -LO     Prior Level of Function  independent:;all household mobility  -LO     Existing Precautions/Restrictions  fall;other (see comments) R adductor canal block  -LO     Barriers to Rehab  previous functional deficit  -LO     Row Name 08/13/21 1316          Living Environment    Lives With  spouse  -LO     Row Name 08/13/21 1316          Home Main Entrance    Number of Stairs, Main Entrance  one  -LO     Stair Railings, Main Entrance  none  -LO     Row Name 08/13/21 1316          Stairs Within Home, Primary    Number of Stairs, Within Home, Primary  none  -LO     Row Name 08/13/21 1316          Cognition    Orientation Status (Cognition)  oriented x 4  -LO     Row Name 08/13/21 1316          Safety Issues, Functional Mobility    Impairments Affecting Function (Mobility)  balance;endurance/activity tolerance;pain;strength;range of motion (ROM);postural/trunk control  -LO     Comment, Safety Issues/Impairments (Mobility)  Up with CGA with FWW, no sx of dizziness this PM  -LO       User Key  (r) = Recorded By, (t) = Taken By, (c) = Cosigned By    Initials Name Provider Type    Radha Reyes, SRIKANTH Physical Therapist        Mobility     Row Name 08/13/21 1317          Bed Mobility    Comment (Bed Mobility)  UIC  -     Row Name 08/13/21 1317          Transfers    Comment (Transfers)  CGA for transfers with FWW with vc for hand placement, no physical assist required  -LO     Row Name 08/13/21 1317          Sit-Stand Transfer    Sit-Stand Ackerman (Transfers)  contact guard;verbal cues  -     Assistive Device  (Sit-Stand Transfers)  walker, front-wheeled  -LO     Row Name 08/13/21 1317          Gait/Stairs (Locomotion)    [place] Level (Gait)  contact guard  -LO     Assistive Device (Gait)  walker, front-wheeled  -LO     Distance in Feet (Gait)  100'  -LO     Deviations/Abnormal Patterns (Gait)  bilateral deviations;right sided deviations;mirlande decreased;steppage;stride length decreased;weight shifting decreased;gait speed decreased  -LO     Bilateral Gait Deviations  heel strike decreased;forward flexed posture;weight shift ability decreased  -LO     [place] Level (Stairs)  contact guard  -LO     Assistive Device (Stairs)  walker, front-wheeled  -LO     Number of Steps (Stairs)  1  -LO     Ascending Technique (Stairs)  step-to-step  -LO     Descending Technique (Stairs)  step-to-step  -LO     Comment (Gait/Stairs)  Patient ambulates x 100' with FWW CGA with vc for heel strike, step thorugh pattern and knee flexion during swing phase of gait. With instruction and cuing, navigates 1 step with FWW CGA with safe sequencing.  -LO     Row Name 08/13/21 1317          Mobility    Extremity Weight-bearing Status  right lower extremity  -LO     Right Lower Extremity (Weight-bearing Status)  weight-bearing as tolerated (WBAT)  -LO       User Key  (r) = Recorded By, (t) = Taken By, (c) = Cosigned By    Initials Name Provider Type    Radha Reyes PT Physical Therapist        Obj/Interventions     Row Name 08/13/21 1321          Range of Motion Comprehensive    General Range of Motion  lower extremity range of motion deficits identified  -LO     Comment, General Range of Motion  R knee ROM -8 to 85 deg active knee motion  -LO     Row Name 08/13/21 1321          Balance    Balance Assessment  sitting static balance;sitting dynamic balance;standing static balance;standing dynamic balance  -LO     Static Sitting Balance  WFL;supported;sitting in chair  -LO     Dynamic Sitting Balance  WFL;supported;sitting in chair  -LO      Static Standing Balance  WFL;supported;standing  -LO     Dynamic Standing Balance  WFL;supported;standing  -LO     Comment, Balance  Patient requires FWW and CGA for safety in standing  -LO     Row Name 08/13/21 1321          Sensory Assessment (Somatosensory)    Sensory Assessment (Somatosensory)  other (see comments) RLE rajesh-incisional numbness  -LO       User Key  (r) = Recorded By, (t) = Taken By, (c) = Cosigned By    Initials Name Provider Type    Radha Reyes, PT Physical Therapist        Goals/Plan     Row Name 08/13/21 1327          Bed Mobility Goal 1 (PT)    Activity/Assistive Device (Bed Mobility Goal 1, PT)  rolling to left;rolling to right;scooting;sit to supine;supine to sit  -LO     [place] Level/Cues Needed (Bed Mobility Goal 1, PT)  independent  -LO     Time Frame (Bed Mobility Goal 1, PT)  long term goal (LTG);10 days  -LO     Row Name 08/13/21 1327          Transfer Goal 1 (PT)    Activity/Assistive Device (Transfer Goal 1, PT)  sit-to-stand/stand-to-sit;bed-to-chair/chair-to-bed;car transfer  -LO     [place] Level/Cues Needed (Transfer Goal 1, PT)  independent  -LO     Time Frame (Transfer Goal 1, PT)  long term goal (LTG);10 days  -LO     Row Name 08/13/21 1327          Gait Training Goal 1 (PT)    Activity/Assistive Device (Gait Training Goal 1, PT)  gait (walking locomotion);assistive device use;decrease fall risk;diminish gait deviation;increase endurance/gait distance;improve balance and speed;walker, rolling  -LO     [place] Level (Gait Training Goal 1, PT)  modified independence  -LO     Distance (Gait Training Goal 1, PT)  200'  -LO     Time Frame (Gait Training Goal 1, PT)  long term goal (LTG);10 days  -LO     Row Name 08/13/21 1327          Patient Education Goal (PT)    Activity (Patient Education Goal, PT)  Patient will demonstrate independence with all HEP  -LO     Time Frame (Patient Education Goal, PT)  long term goal (LTG);10 days  -LO       User Key  (r) =  Recorded By, (t) = Taken By, (c) = Cosigned By    Initials Name Provider Type    Radha Reyes, PT Physical Therapist        Clinical Impression     Row Name 08/13/21 1323          Pain    Additional Documentation  Pain Scale: FACES Pre/Post-Treatment (Group)  -     Row Name 08/13/21 1323          Pain Scale: FACES Pre/Post-Treatment    Pain: FACES Scale, Pretreatment  4-->hurts little more  -LO     Posttreatment Pain Rating  6-->hurts even more  -LO     Pain Location - Side  Right  -LO     Pain Location - Orientation  anterior  -LO     Pain Location  knee  -LO     Pre/Posttreatment Pain Comment  Pain increases in WB, decreases at rest in sitting  -LO     Row Name 08/13/21 1323          Plan of Care Review    Plan of Care Reviewed With  patient;spouse  -     Progress  improving  -LO     Outcome Summary  PT eval completed. Patient is alert and oriented x4. Presents post op R TKA WBAT. Demonstrates deficits in R knee ROM, strength, effecting functional mobility below baseline. Requiring CGA for transfers and ambulation x 100' with FWW. Able to perform step navigation x1 step with CGA with safe sequencing. Performs all HEP with vc for technique. [facility]  -     Row Name 08/13/21 1323          Therapy Assessment/Plan (PT)    Patient/Family Therapy Goals Statement (PT)  go home  -     Rehab Potential (PT)  good, to achieve stated therapy goals  -     Criteria for Skilled Interventions Met (PT)  yes;skilled treatment is necessary  -     Row Name 08/13/21 1323          Vital Signs    Pre Systolic BP Rehab  105  -LO     Pre Treatment Diastolic BP  50  -LO     O2 Delivery Pre Treatment  room air  -LO     O2 Delivery Intra Treatment  room air  -LO     O2 Delivery Post Treatment  room air  -LO     Pre Patient Position  Sitting  -LO     Intra Patient Position  Standing  -LO     Post Patient Position  Sitting  -LO     Row Name 08/13/21 1323          Positioning and Restraints     Pre-Treatment Position  sitting in chair/recliner  -LO     Post Treatment Position  chair  -LO     In Chair  reclined;call light within reach;encouraged to call for assist;exit alarm on;with family/caregiver;waffle cushion;legs elevated  -LO       User Key  (r) = Recorded By, (t) = Taken By, (c) = Cosigned By    Initials Name Provider Type    Radha Reyes, PT Physical Therapist        Outcome Measures     Row Name 08/13/21 1329 08/13/21 0807       How much help from another person do you currently need...    Turning from your back to your side while in flat bed without using bedrails?  4  -LO  4  -TB (r) KN (t) TB (c)    Moving from lying on back to sitting on the side of a flat bed without bedrails?  3  -LO  3  -TB (r) KN (t) TB (c)    Moving to and from a bed to a chair (including a wheelchair)?  3  -LO  2  -TB (r) KN (t) TB (c)    Standing up from a chair using your arms (e.g., wheelchair, bedside chair)?  3  -LO  2  -TB (r) KN (t) TB (c)    Climbing 3-5 steps with a railing?  3  -LO  2  -TB (r) KN (t) TB (c)    To walk in hospital room?  3  -LO  2  -TB (r) KN (t) TB (c)    AM-PAC 6 Clicks Score (PT)  19  -LO  15  -KN    Row Name 08/13/21 1329          PADD    Diagnosis  1  -LO     Gender  1  -LO     Age Group  1  -LO     Gait Distance  0  -LO     Assist Level  1  -LO     Home Support  3  -LO     PADD Score  7  -LO     Patient Preference  home with home health  -LO     Prediction by PADD Score  extended rehabilitation  -LO     Row Name 08/13/21 1329 08/13/21 0859       Functional Assessment    Outcome Measure Options  PADD  -LO  AM-PAC 6 Clicks Daily Activity (OT)  -TBA      User Key  (r) = Recorded By, (t) = Taken By, (c) = Cosigned By    Initials Name Provider Type    TBA Debo Fritz, OT Occupational Therapist    Damaris Spence, RN Registered Nurse    Linda Mccullough RN Registered Nurse    Radha Reyes, PT Physical Therapist                       Physical Therapy Education                  Title: PT OT SLP Therapies (Done)     Topic: Physical Therapy (Done)     Point: Mobility training (Done)     Learning Progress Summary           Patient Acceptance, E, VU,NR by  at 8/13/2021 1233    Comment: Patient education regarding HEP, sequencing for transfers, gait mechanics, step navigation.   Family Acceptance, E, VU,NR by  at 8/13/2021 1233    Comment: Patient education regarding HEP, sequencing for transfers, gait mechanics, step navigation.                   Point: Home exercise program (Done)     Learning Progress Summary           Patient Acceptance, E, VU,NR by  at 8/13/2021 1233    Comment: Patient education regarding HEP, sequencing for transfers, gait mechanics, step navigation.   Family Acceptance, E, VU,NR by  at 8/13/2021 1233    Comment: Patient education regarding HEP, sequencing for transfers, gait mechanics, step navigation.                   Point: Body mechanics (Done)     Learning Progress Summary           Patient Acceptance, E, VU,NR by  at 8/13/2021 1233    Comment: Patient education regarding HEP, sequencing for transfers, gait mechanics, step navigation.   Family Acceptance, E, VU,NR by  at 8/13/2021 1233    Comment: Patient education regarding HEP, sequencing for transfers, gait mechanics, step navigation.                   Point: Precautions (Done)     Learning Progress Summary           Patient Acceptance, E, VU,NR by  at 8/13/2021 1233    Comment: Patient education regarding HEP, sequencing for transfers, gait mechanics, step navigation.   Family Acceptance, E, VU,NR by  at 8/13/2021 1233    Comment: Patient education regarding HEP, sequencing for transfers, gait mechanics, step navigation.                               User Key     Initials Effective Dates Name Provider Type Discipline     06/16/21 -  Radha Rivas, SRIKANTH Physical Therapist PT              PT Recommendation and Plan  Planned Therapy Interventions (PT): balance training, bed mobility training,  gait training, home exercise program, patient/family education, orthotic fitting/training, neuromuscular re-education, stair training, strengthening, transfer training  Plan of Care Reviewed With: patient, spouse  Progress: improving  Outcome Summary: PT eval completed. Patient is alert and oriented x4. Presents post op R TKA WBAT. Demonstrates deficits in R knee ROM, strength, effecting functional mobility below baseline. Requiring CGA for transfers and ambulation x 100' with FWW. Able to perform step navigation x1 step with CGA with safe sequencing. Performs all HEP with vc for technique. Recommend home with assist and HHPT at [place].     Time Calculation:   PT Charges     Row Name 08/13/21 1233             Time Calculation    Start Time  1233  -LO      PT Received On  08/13/21  -LO      PT Goal Re-Cert Due Date  08/23/21  -LO         Timed Charges    25292 - PT Therapeutic Exercise Minutes  10  -LO      54166 - Gait Training Minutes   12  -LO      49525 - PT Therapeutic Activity Minutes  15  -LO         Untimed Charges    PT Eval/Re-eval Minutes  36  -LO         Total Minutes    Timed Charges Total Minutes  37  -LO      Untimed Charges Total Minutes  36  -LO       Total Minutes  73  -LO        User Key  (r) = Recorded By, (t) = Taken By, (c) = Cosigned By    Initials Name Provider Type    LO Radha Rivas, PT Physical Therapist        Therapy Charges for Today     Code Description Service Date Service Provider Modifiers Qty    77685142778 HC GAIT TRAINING EA 15 MIN 8/13/2021 Radha Rivas, PT GP 1    46049683793 HC PT THERAPEUTIC ACT EA 15 MIN 8/13/2021 Radha Rivas, PT GP 1    71277841103 HC PT EVAL LOW COMPLEXITY 3 8/13/2021 Radha Rivas, PT GP 1          PT G-Codes  Outcome Measure Options: PADD  AM-PAC 6 Clicks Score (PT): 19  AM-PAC 6 Clicks Score (OT): 17    Rdaha Rivas PT  8/13/2021

## 2021-08-13 NOTE — PLAN OF CARE
Problem: Adult Inpatient Plan of Care  Goal: Plan of Care Review  Outcome: Ongoing, Progressing  Flowsheets  Taken 8/13/2021 0459 by Cristi Holly, RN  Outcome Summary: Pt rested well during night, some pain reported late in morning, but PO medications controlled well. Vital signs stable and wdl all shift. Voiding without issue. Was unable to ambulate this shift due to continued numbness of right foot. Good flexion and movement while in bed. Pt stated that she couldn't tell where her foot was when she tried to stand up.  Taken 8/12/2021 0807 by Brigido Piper RN  Plan of Care Reviewed With: patient     Problem: Bleeding (Knee Arthroplasty)  Goal: Absence of Bleeding  Outcome: Ongoing, Progressing     Problem: Bowel Elimination Impaired (Knee Arthroplasty)  Goal: Effective Bowel Elimination  Outcome: Ongoing, Progressing  Intervention: Promote Effective Bowel Elimination  Recent Flowsheet Documentation  Taken 8/12/2021 2000 by Cristi Holly, RN  Bowel Elimination Promotion: adequate fluid intake promoted     Problem: Infection (Knee Arthroplasty)  Goal: Absence of Infection Signs and Symptoms  Outcome: Ongoing, Progressing     Problem: Joint Function Impaired (Knee Arthroplasty)  Goal: Optimal Functional Ability  Outcome: Ongoing, Progressing     Problem: Ongoing Anesthesia Effects (Knee Arthroplasty)  Goal: Anesthesia/Sedation Recovery  Outcome: Ongoing, Progressing  Intervention: Optimize Anesthesia Recovery  Recent Flowsheet Documentation  Taken 8/13/2021 0400 by Cristi Holly, RN  Safety Promotion/Fall Prevention:   activity supervised   assistive device/personal items within reach   clutter free environment maintained   fall prevention program maintained   lighting adjusted   room organization consistent   safety round/check completed  Taken 8/13/2021 0200 by Cristi Holly, RN  Safety Promotion/Fall Prevention:   activity supervised   assistive device/personal items within reach   clutter free environment  maintained   fall prevention program maintained   lighting adjusted   room organization consistent   safety round/check completed  Taken 8/13/2021 0000 by Cristi Holly RN  Safety Promotion/Fall Prevention:   activity supervised   assistive device/personal items within reach   clutter free environment maintained   fall prevention program maintained   lighting adjusted   room organization consistent   safety round/check completed  Taken 8/12/2021 2200 by Cristi Holly RN  Safety Promotion/Fall Prevention:   activity supervised   assistive device/personal items within reach   clutter free environment maintained   fall prevention program maintained   lighting adjusted   room organization consistent   safety round/check completed  Taken 8/12/2021 2000 by Cristi Holly RN  Patient Tolerance (IS): good  Safety Promotion/Fall Prevention:   activity supervised   assistive device/personal items within reach   clutter free environment maintained   fall prevention program maintained   gait belt   lighting adjusted   mobility aid in reach   nonskid shoes/slippers when out of bed   room organization consistent   safety round/check completed  Administration (IS): self-administered  Level Incentive Spirometer (mL): 1500  Incentive Spirometer Predicted Level (mL): 1500  Number of Repetitions (IS): 10     Problem: Postoperative Nausea and Vomiting (Knee Arthroplasty)  Goal: Nausea and Vomiting Relief  Outcome: Ongoing, Progressing     Problem: Pain (Knee Arthroplasty)  Goal: Acceptable Pain Control  Outcome: Ongoing, Progressing  Intervention: Prevent or Manage Pain  Recent Flowsheet Documentation  Taken 8/13/2021 0400 by Cristi Holly RN  Pain Management Interventions:   pain management plan reviewed with patient/caregiver   pain pump in use   pillow support provided   position adjusted  Taken 8/13/2021 0200 by Cristi Holly RN  Pain Management Interventions:   pain management plan reviewed with patient/caregiver   pillow  support provided   position adjusted   pain pump in use  Taken 8/13/2021 0000 by Cristi Holly RN  Pain Management Interventions:   pain management plan reviewed with patient/caregiver   pain pump in use   pillow support provided   position adjusted  Taken 8/12/2021 2200 by Cristi Holly RN  Pain Management Interventions:   pain management plan reviewed with patient/caregiver   pain pump in use   pillow support provided   position adjusted  Taken 8/12/2021 2000 by Cristi Holly RN  Pain Management Interventions:   pain management plan reviewed with patient/caregiver   pain pump in use   pillow support provided   position adjusted  Diversional Activities:   television   music     Problem: Postoperative Urinary Retention (Knee Arthroplasty)  Goal: Effective Urinary Elimination  Outcome: Ongoing, Progressing     Problem: Fall Injury Risk  Goal: Absence of Fall and Fall-Related Injury  Outcome: Ongoing, Progressing  Intervention: Identify and Manage Contributors to Fall Injury Risk  Recent Flowsheet Documentation  Taken 8/12/2021 2000 by Cristi Holly RN  Medication Review/Management:   medications reviewed   high-risk medications identified  Intervention: Promote Injury-Free Environment  Recent Flowsheet Documentation  Taken 8/13/2021 0400 by Cristi Holly RN  Safety Promotion/Fall Prevention:   activity supervised   assistive device/personal items within reach   clutter free environment maintained   fall prevention program maintained   lighting adjusted   room organization consistent   safety round/check completed  Taken 8/13/2021 0200 by Cristi Holly RN  Safety Promotion/Fall Prevention:   activity supervised   assistive device/personal items within reach   clutter free environment maintained   fall prevention program maintained   lighting adjusted   room organization consistent   safety round/check completed  Taken 8/13/2021 0000 by Cristi Holly RN  Safety Promotion/Fall Prevention:   activity  supervised   assistive device/personal items within reach   clutter free environment maintained   fall prevention program maintained   lighting adjusted   room organization consistent   safety round/check completed  Taken 8/12/2021 2200 by Cristi Holly, RN  Safety Promotion/Fall Prevention:   activity supervised   assistive device/personal items within reach   clutter free environment maintained   fall prevention program maintained   lighting adjusted   room organization consistent   safety round/check completed  Taken 8/12/2021 2000 by Cristi Holly, RN  Safety Promotion/Fall Prevention:   activity supervised   assistive device/personal items within reach   clutter free environment maintained   fall prevention program maintained   gait belt   lighting adjusted   mobility aid in reach   nonskid shoes/slippers when out of bed   room organization consistent   safety round/check completed   Goal Outcome Evaluation:              Outcome Summary: Pt rested well during night, some pain reported late in morning, but PO medications controlled well. Vital signs stable and wdl all shift. Voiding without issue. Was unable to ambulate this shift due to continued numbness of right foot. Good flexion and movement while in bed. Pt stated that she couldn't tell where her foot was when she tried to stand up.

## 2021-08-13 NOTE — PLAN OF CARE
Goal Outcome Evaluation:  Plan of Care Reviewed With: patient, spouse        Progress: improving  Outcome Summary: PT eval completed. Patient is alert and oriented x4. Presents post op R TKA WBAT. Demonstrates deficits in R knee ROM, strength, effecting functional mobility below baseline. Requiring CGA for transfers and ambulation x 100' with FWW. Able to perform step navigation x1 step with CGA with safe sequencing. Performs all HEP with vc for technique. Recommend home with assist and HHPT at [place].

## 2021-08-14 NOTE — ADDENDUM NOTE
Addendum  created 08/14/21 1146 by Rayray Harrison CRNA    Clinical Note Signed, Diagnosis association updated, Intraprocedure Blocks edited

## 2021-08-15 NOTE — PROGRESS NOTES
[facility]    Nerve Cath Post Op Call    Patient Name: Lorie Barrios  :  1950  MRN:  [id number]  Date of Discharge: 2021    Nerve Cath Post Op Call:    Catheter Plan:Patient/Family member report nerve catheter previously discontinued, tip intact  Patient/Family instructed to call ON CALL anesthesia provider for any questions or problems.  Patient Follow Up:

## 2021-10-08 ENCOUNTER — TRANSCRIBE ORDERS (OUTPATIENT)
Dept: ADMINISTRATIVE | Facility: HOSPITAL | Age: 71
End: 2021-10-08

## 2021-10-08 DIAGNOSIS — Z78.0 MENOPAUSE: ICD-10-CM

## 2021-10-08 DIAGNOSIS — Z13.820 SCREENING FOR OSTEOPOROSIS: Primary | ICD-10-CM

## 2021-10-08 DIAGNOSIS — Z12.31 VISIT FOR SCREENING MAMMOGRAM: Primary | ICD-10-CM

## 2021-10-15 ENCOUNTER — APPOINTMENT (OUTPATIENT)
Dept: BONE DENSITY | Facility: HOSPITAL | Age: 71
End: 2021-10-15

## 2021-10-15 DIAGNOSIS — Z13.820 SCREENING FOR OSTEOPOROSIS: ICD-10-CM

## 2021-10-15 DIAGNOSIS — Z78.0 MENOPAUSE: ICD-10-CM

## 2021-10-15 PROCEDURE — 77080 DXA BONE DENSITY AXIAL: CPT

## 2021-12-10 ENCOUNTER — HOSPITAL ENCOUNTER (OUTPATIENT)
Dept: MAMMOGRAPHY | Facility: HOSPITAL | Age: 71
Discharge: HOME | End: 2021-12-10
Admitting: INTERNAL MEDICINE

## 2021-12-10 DIAGNOSIS — Z12.31 VISIT FOR SCREENING MAMMOGRAM: ICD-10-CM

## 2021-12-10 PROCEDURE — 77067 SCR MAMMO BI INCL CAD: CPT

## 2021-12-10 PROCEDURE — 77067 SCR MAMMO BI INCL CAD: CPT | Performed by: RADIOLOGY

## 2021-12-10 PROCEDURE — 77063 BREAST TOMOSYNTHESIS BI: CPT

## 2021-12-10 PROCEDURE — 77063 BREAST TOMOSYNTHESIS BI: CPT | Performed by: RADIOLOGY

## 2022-01-24 ENCOUNTER — HOSPITAL ENCOUNTER (OUTPATIENT)
Dept: MAMMOGRAPHY | Facility: HOSPITAL | Age: 72
Discharge: HOME | End: 2022-01-24

## 2022-01-24 ENCOUNTER — HOSPITAL ENCOUNTER (OUTPATIENT)
Dept: ULTRASOUND IMAGING | Facility: HOSPITAL | Age: 72
Discharge: HOME | End: 2022-01-24

## 2022-01-24 DIAGNOSIS — R92.8 ABNORMAL MAMMOGRAM: ICD-10-CM

## 2022-01-24 PROCEDURE — 76642 ULTRASOUND BREAST LIMITED: CPT | Performed by: RADIOLOGY

## 2022-01-24 PROCEDURE — 0 LIDOCAINE 1 % SOLUTION: Performed by: RADIOLOGY

## 2022-01-24 PROCEDURE — 76642 ULTRASOUND BREAST LIMITED: CPT

## 2022-01-24 PROCEDURE — 88305 TISSUE EXAM BY PATHOLOGIST: CPT | Performed by: INTERNAL MEDICINE

## 2022-01-24 PROCEDURE — 19083 BX BREAST 1ST LESION US IMAG: CPT | Performed by: RADIOLOGY

## 2022-01-24 PROCEDURE — 77061 BREAST TOMOSYNTHESIS UNI: CPT | Performed by: RADIOLOGY

## 2022-01-24 PROCEDURE — A4648 IMPLANTABLE TISSUE MARKER: HCPCS

## 2022-01-24 PROCEDURE — 77065 DX MAMMO INCL CAD UNI: CPT | Performed by: RADIOLOGY

## 2022-01-24 PROCEDURE — 88342 IMHCHEM/IMCYTCHM 1ST ANTB: CPT | Performed by: INTERNAL MEDICINE

## 2022-01-24 PROCEDURE — 88341 IMHCHEM/IMCYTCHM EA ADD ANTB: CPT | Performed by: INTERNAL MEDICINE

## 2022-01-24 PROCEDURE — 88360 TUMOR IMMUNOHISTOCHEM/MANUAL: CPT | Performed by: INTERNAL MEDICINE

## 2022-01-24 PROCEDURE — 77065 DX MAMMO INCL CAD UNI: CPT

## 2022-01-24 PROCEDURE — G0279 TOMOSYNTHESIS, MAMMO: HCPCS

## 2022-01-24 RX ORDER — LIDOCAINE HYDROCHLORIDE 10 MG/ML
5 INJECTION, SOLUTION INFILTRATION; PERINEURAL ONCE
Status: COMPLETED | OUTPATIENT
Start: 2022-01-24 | End: 2022-01-24

## 2022-01-24 RX ORDER — LIDOCAINE HYDROCHLORIDE AND EPINEPHRINE 10; 10 MG/ML; UG/ML
10 INJECTION, SOLUTION INFILTRATION; PERINEURAL ONCE
Status: COMPLETED | OUTPATIENT
Start: 2022-01-24 | End: 2022-01-24

## 2022-01-24 RX ADMIN — Medication 1 ML: at 11:48

## 2022-01-24 RX ADMIN — LIDOCAINE HYDROCHLORIDE,EPINEPHRINE BITARTRATE 1 ML: 10; .01 INJECTION, SOLUTION INFILTRATION; PERINEURAL at 11:48

## 2022-01-24 NOTE — PROGRESS NOTES
Alert and orientated. Denies discomfort. No active bleeding. Steri-strips not visualized, gauze dressing intact. Ice packs given. Verbalizes and demonstrates understanding of post-care instructions, written copy given.

## 2022-01-26 ENCOUNTER — TELEPHONE (OUTPATIENT)
Dept: MAMMOGRAPHY | Facility: HOSPITAL | Age: 72
End: 2022-01-26

## 2022-01-26 LAB
CYTO UR: NORMAL
LAB AP CASE REPORT: NORMAL
LAB AP CLINICAL INFORMATION: NORMAL
LAB AP DIAGNOSIS COMMENT: NORMAL
LAB AP SPECIAL STAINS: NORMAL
PATH REPORT.FINAL DX SPEC: NORMAL
PATH REPORT.GROSS SPEC: NORMAL

## 2022-01-26 NOTE — TELEPHONE ENCOUNTER
Referring provider's office notified pathology returned as cancer & patient will be notified.   Patient notified of pathology results and recommendation. Verbalizes understanding. Denies discomfort.  Denies signs and symptoms of infection.   Patient desires Dr Castro for surgical consult and was called back with appointment.   Patient notified of appointment on 2.2.22 @ 3443. Patient verbalized understanding. Patient given office contact & location information. Told to bring photo ID, list of prescription & OTC medications, insurance information, must wear a mask & can bring one person for support also wearing a mask.   Reviewed what would be discussed at surgical consult visit, including detailed explanation of pathology report & imaging reports; treatment options & pros/cons, availability of Breast Nurse Navigator. Patient encouraged to call back or contact Breast Nurse Navigator, with any questions or concerns. Patient information sent to Navigator for evaluation & possible referral for genetic counseling.  Breast cancer information packet offered and accepted.

## 2022-01-27 ENCOUNTER — HOSPITAL ENCOUNTER (OUTPATIENT)
Dept: GENERAL RADIOLOGY | Facility: HOSPITAL | Age: 72
Discharge: HOME | End: 2022-01-27
Admitting: INTERNAL MEDICINE

## 2022-01-27 ENCOUNTER — TRANSCRIBE ORDERS (OUTPATIENT)
Dept: GENERAL RADIOLOGY | Facility: HOSPITAL | Age: 72
End: 2022-01-27

## 2022-01-27 DIAGNOSIS — M54.2 PAIN IN NECK: Primary | ICD-10-CM

## 2022-01-27 PROCEDURE — 72052 X-RAY EXAM NECK SPINE 6/>VWS: CPT

## 2022-02-01 ENCOUNTER — TRANSCRIBE ORDERS (OUTPATIENT)
Dept: OCCUPATIONAL THERAPY | Facility: HOSPITAL | Age: 72
End: 2022-02-01

## 2022-02-01 DIAGNOSIS — C50.911 MALIGNANT NEOPLASM OF RIGHT FEMALE BREAST, UNSPECIFIED ESTROGEN RECEPTOR STATUS, UNSPECIFIED SITE OF BREAST: Primary | ICD-10-CM

## 2022-02-02 ENCOUNTER — APPOINTMENT (OUTPATIENT)
Dept: GENETICS | Facility: HOSPITAL | Age: 72
End: 2022-02-02

## 2022-02-09 ENCOUNTER — CLINICAL SUPPORT (OUTPATIENT)
Dept: GENETICS | Facility: HOSPITAL | Age: 72
End: 2022-02-09

## 2022-02-09 ENCOUNTER — NURSE NAVIGATOR (OUTPATIENT)
Dept: OTHER | Facility: HOSPITAL | Age: 72
End: 2022-02-09

## 2022-02-09 ENCOUNTER — HOSPITAL ENCOUNTER (OUTPATIENT)
Dept: OCCUPATIONAL THERAPY | Facility: HOSPITAL | Age: 72
Setting detail: THERAPIES SERIES
Discharge: HOME | End: 2022-02-09

## 2022-02-09 ENCOUNTER — LAB (OUTPATIENT)
Dept: LAB | Facility: HOSPITAL | Age: 72
End: 2022-02-09

## 2022-02-09 VITALS — WEIGHT: 206 LBS | BODY MASS INDEX: 34.32 KG/M2 | HEIGHT: 65 IN

## 2022-02-09 DIAGNOSIS — Z80.3 FAMILY HISTORY OF BREAST CANCER: ICD-10-CM

## 2022-02-09 DIAGNOSIS — Z13.79 GENETIC TESTING: Primary | ICD-10-CM

## 2022-02-09 DIAGNOSIS — Z17.0 MALIGNANT NEOPLASM OF LOWER-OUTER QUADRANT OF RIGHT BREAST OF FEMALE, ESTROGEN RECEPTOR POSITIVE: Primary | ICD-10-CM

## 2022-02-09 DIAGNOSIS — Z17.0 MALIGNANT NEOPLASM OF BREAST IN FEMALE, ESTROGEN RECEPTOR POSITIVE, UNSPECIFIED LATERALITY, UNSPECIFIED SITE OF BREAST: Primary | ICD-10-CM

## 2022-02-09 DIAGNOSIS — C50.911 MALIGNANT NEOPLASM OF RIGHT FEMALE BREAST, UNSPECIFIED ESTROGEN RECEPTOR STATUS, UNSPECIFIED SITE OF BREAST: Primary | ICD-10-CM

## 2022-02-09 DIAGNOSIS — Z13.79 GENETIC SCREENING: ICD-10-CM

## 2022-02-09 DIAGNOSIS — Z80.41 FHX: OVARIAN CANCER: ICD-10-CM

## 2022-02-09 DIAGNOSIS — C50.919 MALIGNANT NEOPLASM OF BREAST IN FEMALE, ESTROGEN RECEPTOR POSITIVE, UNSPECIFIED LATERALITY, UNSPECIFIED SITE OF BREAST: Primary | ICD-10-CM

## 2022-02-09 DIAGNOSIS — C50.511 MALIGNANT NEOPLASM OF LOWER-OUTER QUADRANT OF RIGHT BREAST OF FEMALE, ESTROGEN RECEPTOR POSITIVE: Primary | ICD-10-CM

## 2022-02-09 PROCEDURE — 96040: CPT | Performed by: GENETIC COUNSELOR, MS

## 2022-02-09 PROCEDURE — 97166 OT EVAL MOD COMPLEX 45 MIN: CPT

## 2022-02-09 PROCEDURE — 93702 BIS XTRACELL FLUID ANALYSIS: CPT

## 2022-02-09 NOTE — THERAPY DISCHARGE NOTE
Outpatient Occupational Therapy Lymphedema Initial Evaluation/Discharge   [place]     Patient Name: Tracy Barrios  : 1950  MRN: [id number]  Today's Date: 2022        Visit Date: 2022    Patient Active Problem List   Diagnosis   • SVT (supraventricular tachycardia) (HCC)   • Hypertension   • Dyslipidemia   • Diabetes mellitus (HCC)   • GERD (gastroesophageal reflux disease)   • Migraine   • Arthritis   • Atrial tachycardia (HCC)   • Preop examination   • Arthritis of knee   • Status post total right knee replacement   • History of total right knee replacement        Past Medical History:   Diagnosis Date   • Arthritis    • Atrial fibrillation (HCC)    • DDD (degenerative disc disease), cervical    • DDD (degenerative disc disease), thoracic    • Diabetes mellitus (HCC)    • Dyslipidemia    • GERD (gastroesophageal reflux disease)     GERD/ Hiatal Hernia   • Hyperlipidemia    • Hypertension    • Macular degeneration    • Migraine     Migraine headaches   • PONV (postoperative nausea and vomiting)    • SVT (supraventricular tachycardia) (HCC)    • Tinnitus    • Wears glasses     for distance only        Past Surgical History:   Procedure Laterality Date   • ABLATION OF DYSRHYTHMIC FOCUS      treatment for A-fib was successful   • APPENDECTOMY     • BLADDER SURGERY      bladder tack   • BREAST BIOPSY Right     Ultrasound guided   • CARDIAC CATHETERIZATION     • HYSTERECTOMY      Total w/BSO/complete   • KNEE ARTHROSCOPY Right 10/30/2020    Procedure: Right knee diagnostic arthroscopy partial lateral meniscectomy;  Surgeon: Robert Saeed MD;  Location: [facility];  Service: Orthopedics;  Laterality: Right;   • OOPHORECTOMY Bilateral    • REDUCTION MAMMAPLASTY Bilateral    • TONSILLECTOMY     • TOTAL KNEE ARTHROPLASTY Right 2021    Procedure: TOTAL KNEE REPLACEMENT RIGHT;  Surgeon: Lul Banks MD;  Location: [facility];  Service: Orthopedics;  Laterality:  Right;   • VAGINAL DELIVERY      x2         Visit Dx:    ICD-10-CM ICD-9-CM   1. Malignant neoplasm of right female breast, unspecified estrogen receptor status, unspecified site of breast (HCC)  C50.911 174.9            Lymphedema     Row Name 02/09/22 1000             Subjective Pain    Able to rate subjective pain? yes  -SG      Pre-Treatment Pain Level 0  -SG      Post-Treatment Pain Level 0  -SG              Subjective Comments    Subjective Comments Pt reports that she will have MRI and genetics before making surgical decision. She currently has full AROM and strength of BUE. She does have pain in her neck, which she is about to start PT for.  -SG              Lymphedema Assessment    Lymphedema Classification RUE:; at risk/stage 0  -SG              General ROM    GENERAL ROM COMMENTS BUE WFL  -SG              MMT (Manual Muscle Testing)    General MMT Comments BUE WFL  -SG              Lymphedema Edema Assessment    Edema Assessment Comment Pt does not demo any edema at this time  -SG              Skin Changes/Observations    Location/Assessment Upper Extremity; Upper Quadrant  -SG      Upper Extremity Conditions bilateral:; normal  -SG      Upper Extremity Color/Pigment bilateral:; normal  -SG      Upper Quadrant Conditions bilateral:; normal  -SG      Upper Quadrant Color/Pigment bilateral:; normal  -SG              Lymphedema Sensation    Lymphedema Sensation Reports normal  -SG              L-Dex Bioimpedence Screening    L-Dex Measurement Extremity RUE  -SG      L-Dex Patient Position Standing  -SG      L-Dex UE Dominate Side Right  -SG      L-Dex UE At Risk Side Right  -SG      L-Dex UE Pre Surgical Value Yes  -SG      L-Dex UE Baseline Score -2.3  -SG      L-Dex UE Comment The patient had a baseline SOZO measurement which I reviewed today. The score is in normal range, -2.3, see scanned to EMR. Bioimpedance spectroscopy helps identify the onset of lymphedema in an arm or leg before patients experience  "noticeable swelling. Research has shown that the early detection of lymphedema using L-Dex combined with treatment can reduce progression to chronic lymphedema by 95% in breast cancer patients. Whenever possible, patients are tested for baseline L-Dex score before cancer treatment begins and then are reassessed during regular follow-up visits using the SOZO device. Otherwise, this can be started postoperatively and continued during regular follow-up visits. If the patient’s L-Dex score increases above normal levels, that is a sign that lymphedema is developing and a referral is made to occupational/physical therapy for further evaluation and early compression treatment. Lymphedema assessment with the SOZO L-Dex score is recommended to be done every 3 months for the first 3 years and then every 6 months for years 4 and 5 followed by annually afterwards.  -SG      Skeletal Muscle Mass (%) 17.2 %  -SG      Fat Mass (%) 48.5 %  -SG      Hy-dex -20.6  -SG      Height 165.1 cm (65\")  -SG      Weight 93.4 kg (206 lb)  -SG      BMI (Calculated) 34.3  -SG            User Key  (r) = Recorded By, (t) = Taken By, (c) = Cosigned By    Initials Name Provider Type    Jocelyn Palacio, OTR/L Occupational Therapist                        Therapy Education  Education Details: Pt edu on prehab assessments and results. Pt provided w/ [facility] information packet, where she  will learn more about lymphedema. appropriate exercise/stretching post op breast surgery, etc.  Given: HEP, Symptoms/condition management  Program: New  How Provided: Verbal, Demonstration, Written  Provided to: Patient  Level of Understanding: Verbalized             OT Goals     Row Name 02/09/22 1000          OT Short Term Goals    STG Date to Achieve 03/11/22  -SG     STG 1 Pt demonstrates understanding of post-operative basic lymphedema precautions.  -SG     STG 1 Progress Met  -SG     STG 2 Pt demonstrates awareness of post-operative movement restrictions and HEP to " facilitate lymphatic regeneration and reduce the risk of seroma formation, axillary web syndrome, and lymphedema while ensuring shoulder joint mobility.  -SG     STG 2 Progress Met  -SG            Time Calculation    OT Goal Re-Cert Due Date 05/10/22  -SG           User Key  (r) = Recorded By, (t) = Taken By, (c) = Cosigned By    Initials Name Provider Type    Jocelyn Palacio, OTR/L Occupational Therapist                 OT Assessment/Plan     Row Name 02/09/22 1000          OT Assessment    Assessment Comments Ms. Barrios presents to OT pre-operatively for planned BrCA surgery scheduled in a couple weeks. Baseline ROM, postural, and arm circumference measurements wer taken today to be compared to measurements retaken 3-4 weeks post surgery. At that time, any reduced movement, decline in function, or postural issues will be addressed with skilled care and new goals will be established.  Personal risk factors for lymphedema post-operatively for the R upper extremity and trunk quadrant were also assessed today and basic lymphedema precautions were discussed. A more detailed discussion regarding personal lymphedema risk factors will take place post-operatively once the number of lymph nodes removed and the plan for further medical care is known.  -SG     Please refer to paper survey for additional self-reported information Yes  -SG     OT Diagnosis BrCA  -SG     OT Rehab Potential Excellent  -SG     Patient/caregiver participated in establishment of treatment plan and goals Yes  -SG     Patient would benefit from skilled therapy intervention Yes  -SG            OT Plan    Planned CPT's? OT EVAL MOD COMPLEXITY: 67263; OT THER ACT EA 15 MIN: 43845EL; OT THER PROC EA 15 MIN: 99348SL; OT SELF CARE/MGMT/TRAIN 15 MIN: 70302; OT MANUAL THERAPY EA 15 MIN: 54950; OT BIS XTRACELL FLUID ANALYSIS: 50616  -SG     Planned Therapy Interventions (Optional Details) home exercise program; joint mobilization; manual therapy techniques;  patient/family education; postural re-education; ROM (Range of Motion); strengthening; stretching  -SG     OT Plan Comments Ms. Barrios may return to OT 3-4 weeks post operatively for re-evaluation measurements to be compared to measurements taken today, at her pre-operative evaluation. In addition, she will be examined for possible post-BrCA surgery sequelae such as axillary web syndrome, scar adhesions, edema, worsened posture, scapular winging, pain, and reduced ROM and function. At that time, a future plan and goals will be established and skilled care continued if indicated. Currently, Ms. Barrios has been provided with information for healing after breast surgery including lymphedema education, safe exercising/stretching, etc. in order to facilitate recovery and reduce the risk of post-operative sequelae.  -SG           User Key  (r) = Recorded By, (t) = Taken By, (c) = Cosigned By    Initials Name Provider Type    Jocelyn Palacio, OTR/L Occupational Therapist                Outcome Measure Options: Quick DASH  Quick DASH  Open a tight or new jar.: Mild Difficulty  Do heavy household chores (e.g., wash walls, wash floors): Mild Difficulty  Carry a shopping bag or briefcase: No Difficulty  Wash your back: No Difficulty  Use a knife to cut food: No Difficulty  Recreational activities in which you take some force or impact through your arm, should or hand (e.g. golf, hammering, tennis, etc.): Mild Difficulty  During the past week, to what extent has your arm, shoulder, or hand problem interfered with your normal social activites with family, friends, neighbors or groups?: Not at all  During the past week, were you limited in your work or other regular daily activities as a result of your arm, shoulder or hand problem?: Not limited at all  Arm, Shoulder, or hand pain: None  Tingling (pins and needles) in your arm, shoulder, or hand: None  During the past week, how much difficulty have you had sleeping because of the  pain in your arm, shoulder or hand?: No difficulty  Number of Questions Answered: 11  Quick DASH Score: 6.82         Time Calculation:   OT Start Time: 1000     Therapy Charges for Today     Code Description Service Date Service Provider Modifiers Qty    18855297872 HC OT EVAL MOD COMPLEXITY 3 2/9/2022 Jocelyn Avendano, OTR/L GO 1    74676367090 HC OT BIS XTRACELL FLUID ANALYSIS 2/9/2022 Jocelyn Avendano OTR/L GO 1                       Jocelyn Avendano OTR/L  2/9/2022

## 2022-02-09 NOTE — PROGRESS NOTES
I saw patient with Dr LONGO and patient's . Dr LONGO reviewed the pathology report and surgical options - right breast IG IDC, ER/ND positive and HER negative breast cancer - stage IA. An MRI has been ordered - if clear the patient prefers BCT - Educational and Supportive materials were given and reviewed- patient saw Jocelyn Avendano OT

## 2022-02-09 NOTE — PROGRESS NOTES
Tracy Barrios is a 71-year-old female who was seen for genetic counseling due to a personal history of breast cancer.   Ms. Barrios was recently diagnosed with an ER/NE+ breast cancer at age 71.  She is post-menopausal and has had a hysterectomy and bilateral salpingo-oophorectomy.  Ms. Barrios was interested in discussing her risk for a hereditary cancer syndrome, and decided to pursue genetic testing.   Ms. Barrios opted to pursue comprehensive testing via the CancerNext panel ordered through [employer] which includes BRCA1/2 and 34 additional genes associated with an increased risk of breast cancer or other cancers (APC, MICHELE, AXIN2, BARD1, BMPR1A, BRCA1, BRCA2, BRIP1, CDH1, CDK4, CDKN2A, CHEK2, DICER1, EPCAM, GREM1, HOXB13, MLH1, MRE11A, MSH2, MSH3, MSH6, MUTYH, NBN, NF1, NTHL1, PALB2, PMS2, POLD1, POLE, PTEN, RAD51C, RAD51D, RECQL, SMAD4, SMARCA4, STK11, and TP53).  Results from the high/moderate risk breast cancer genes are expected in 10 days, and results from the remainder of the panel are expected in 2-3 weeks.     PERTINENT FAMILY HISTORY:  Mother:  Breast cancer, 71  Mat. Grandmother: Ovarian cancer, 52  Father:   Prostate cancer, 71  Pat. Uncle:  Metastatic prostate cancer  Pat. Aunt:   Lung cancer  Pat. Grandfather:  Lung cancer     RISK ASSESSMENT:  Ms. Barrios’ personal history of breast cancer in combination with her family history raises the question of a hereditary cancer syndrome.   NCCN guidelines recommend BRCA1/2 testing for individuals diagnosed with breast cancer at any if they have a close relative with ovarian cancer, or two or more close relatives on the same side of the family diagnosed with prostate cancer.  Therefore, testing is appropriate for Ms. Barrios.  We discussed that the standard approach to BRCA1/2 testing is via a multigene panel that evaluates BRCA1/2 and multiple other genes known to impact cancer risk.  This risk assessment is based on the family history information provided at  the time of the appointment.  The assessment could change in the future should new information be obtained.     GENETIC COUNSELING (30 minutes): We reviewed the family history information in detail.  Cases of breast cancer follow three general patterns: sporadic, familial, and hereditary.  While most cancer is sporadic (75-80%), some cases appear to occur in family clusters.  These cases are said to be familial and account for 10-20% of breast cancer cases.  Familial cases may be due to a combination of shared genes and environmental factors among family members.  In even fewer cases (5-10%), the risk for cancer is inherited, and the genes responsible for the increased cancer risk are known.       Family histories typical of hereditary cancer syndromes usually include multiple first- and second-degree relatives diagnosed with cancer types that define a syndrome.  These cases tend to be diagnosed at younger-than-expected ages and can be bilateral or multifocal.  The cancer in these families follows an autosomal dominant inheritance pattern, which indicates the likely presence of a mutation in a cancer susceptibility gene.  Children and siblings of an individual believed to carry this mutation have a 50% chance of inheriting that mutation, thereby inheriting the increased risk to develop cancer.  These mutations can be passed down from the maternal or the paternal lineage.     Hereditary breast cancer accounts for 5-10% of all cases of breast cancer.  A significant proportion of hereditary breast cancer can be attributed to mutations in the BRCA1 and BRCA2 genes.  Mutations in these genes confer an increased risk for breast cancer, ovarian cancer, male breast cancer, prostate cancer and pancreatic cancer.  There are other genes that are known to be associated with an increased risk for breast cancer and other cancers. Based on Ms. Barrios’s desire to get as much information as possible regarding her personal risks and  potential risks for her family, she opted to pursue testing through a panel that would look at several other genes known to increase the risk for breast cancer.     GENETIC TESTING:  The risks, benefits and limitations of genetic testing and implications for clinical management following testing were reviewed.  DNA test results can influence decisions regarding screening, prevention and surgical management.  Genetic testing can have significant psychological implications for both individuals and families.  Also discussed was the possibility of insurance discrimination based on genetic test results and the laws ([place]) in place to prevent this.     We discussed panel testing, which would involve testing for BRCA1/2 as well as several other cancer susceptibility genes at the same time.  The benefits and limitations of genetic testing were discussed and Ms. Barrios decided to pursue testing. The implications of a positive or negative test result were discussed. We discussed the possibility that, in some cases, genetic test results may be uninformative due to the identification of a genetic variant of uncertain significance. These variants may or may not be associated with an increased cancer risk.  VUSs are frequently reported through multigene panel testing, given the presence of genetic variation in the population and the number of genes being analyzed. Given her personal history, a negative test result would not eliminate all breast cancer risk to her relatives, although the risk would not be as high as it would with positive genetic testing.          PLAN: The patient will be contacted by telephone once results are available.  Genetic counseling remains available to Ms. Barrios and her family in the future, and she is welcome to contact us at [phone] with any questions she may have.        Mandy Oreilly MS, JD [facility]  Licensed Certified Genetic Counselor

## 2022-02-14 ENCOUNTER — HOSPITAL ENCOUNTER (OUTPATIENT)
Dept: MRI IMAGING | Facility: HOSPITAL | Age: 72
Discharge: HOME | End: 2022-02-14
Admitting: SURGERY

## 2022-02-14 DIAGNOSIS — C50.311 MALIGNANT NEOPLASM OF LOWER-INNER QUADRANT OF RIGHT FEMALE BREAST: ICD-10-CM

## 2022-02-14 LAB — CREAT BLDA-MCNC: 0.8 MG/DL (ref 0.6–1.3)

## 2022-02-14 PROCEDURE — 77049 MRI BREAST C-+ W/CAD BI: CPT | Performed by: RADIOLOGY

## 2022-02-14 PROCEDURE — A9577 INJ MULTIHANCE: HCPCS | Performed by: SURGERY

## 2022-02-14 PROCEDURE — 0 GADOBENATE DIMEGLUMINE 529 MG/ML SOLUTION: Performed by: SURGERY

## 2022-02-14 PROCEDURE — 82565 ASSAY OF CREATININE: CPT

## 2022-02-14 PROCEDURE — 77049 MRI BREAST C-+ W/CAD BI: CPT

## 2022-02-14 RX ADMIN — GADOBENATE DIMEGLUMINE 19 ML: 529 INJECTION, SOLUTION INTRAVENOUS at 11:30

## 2022-02-21 ENCOUNTER — TRANSCRIBE ORDERS (OUTPATIENT)
Dept: ADMINISTRATIVE | Facility: HOSPITAL | Age: 72
End: 2022-02-21

## 2022-02-22 ENCOUNTER — TRANSCRIBE ORDERS (OUTPATIENT)
Dept: ADMINISTRATIVE | Facility: HOSPITAL | Age: 72
End: 2022-02-22

## 2022-02-22 DIAGNOSIS — C50.311 MALIGNANT NEOPLASM OF LOWER-INNER QUADRANT OF RIGHT FEMALE BREAST, UNSPECIFIED ESTROGEN RECEPTOR STATUS: Primary | ICD-10-CM

## 2022-02-25 ENCOUNTER — TELEPHONE (OUTPATIENT)
Dept: GENETICS | Facility: HOSPITAL | Age: 72
End: 2022-02-25

## 2022-02-25 NOTE — TELEPHONE ENCOUNTER
Called patient to discussed genetic testing status. I was notified by [employer] that the sample failed NGS sequencing, was reprepped, and failed again, leading to the lab requesting a new sample.  [facility] has overnighted a saliva sample collection kit to Ms. Barrios. I left the patient a detailed voicemail explaining the reason for needing for a new sample and process for providing and sending in sample.  Asked her to call with any questions.  I will follow-up with patient on Monday to confirm that sample collection kit was received.

## 2022-03-04 ENCOUNTER — TRANSCRIBE ORDERS (OUTPATIENT)
Dept: MAMMOGRAPHY | Facility: HOSPITAL | Age: 72
End: 2022-03-04

## 2022-03-04 DIAGNOSIS — C50.311 MALIGNANT NEOPLASM OF LOWER-INNER QUADRANT OF RIGHT FEMALE BREAST, UNSPECIFIED ESTROGEN RECEPTOR STATUS: Primary | ICD-10-CM

## 2022-03-16 ENCOUNTER — PRE-ADMISSION TESTING (OUTPATIENT)
Dept: PREADMISSION TESTING | Facility: HOSPITAL | Age: 72
End: 2022-03-16

## 2022-03-16 LAB
ALBUMIN SERPL-MCNC: 4.2 G/DL (ref 3.5–5.2)
ALBUMIN/GLOB SERPL: 1.4 G/DL
ALP SERPL-CCNC: 173 U/L (ref 39–117)
ALT SERPL W P-5'-P-CCNC: 14 U/L (ref 1–33)
ANION GAP SERPL CALCULATED.3IONS-SCNC: 12 MMOL/L (ref 5–15)
AST SERPL-CCNC: 14 U/L (ref 1–32)
BILIRUB SERPL-MCNC: 0.3 MG/DL (ref 0–1.2)
BUN SERPL-MCNC: 18 MG/DL (ref 8–23)
BUN/CREAT SERPL: 20.9 (ref 7–25)
CALCIUM SPEC-SCNC: 10.7 MG/DL (ref 8.6–10.5)
CHLORIDE SERPL-SCNC: 103 MMOL/L (ref 98–107)
CO2 SERPL-SCNC: 25 MMOL/L (ref 22–29)
CREAT SERPL-MCNC: 0.86 MG/DL (ref 0.57–1)
DEPRECATED RDW RBC AUTO: 46.2 FL (ref 37–54)
EGFRCR SERPLBLD CKD-EPI 2021: 72.3 ML/MIN/1.73
ERYTHROCYTE [DISTWIDTH] IN BLOOD BY AUTOMATED COUNT: 14 % (ref 12.3–15.4)
GLOBULIN UR ELPH-MCNC: 2.9 GM/DL
GLUCOSE SERPL-MCNC: 103 MG/DL (ref 65–99)
HCT VFR BLD AUTO: 43 % (ref 34–46.6)
HGB BLD-MCNC: 14.1 G/DL (ref 12–15.9)
MCH RBC QN AUTO: 29.6 PG (ref 26.6–33)
MCHC RBC AUTO-ENTMCNC: 32.8 G/DL (ref 31.5–35.7)
MCV RBC AUTO: 90.1 FL (ref 79–97)
PLATELET # BLD AUTO: 306 10*3/MM3 (ref 140–450)
PMV BLD AUTO: 8.6 FL (ref 6–12)
POTASSIUM SERPL-SCNC: 4.8 MMOL/L (ref 3.5–5.2)
PROT SERPL-MCNC: 7.1 G/DL (ref 6–8.5)
QT INTERVAL: 358 MS
QTC INTERVAL: 402 MS
RBC # BLD AUTO: 4.77 10*6/MM3 (ref 3.77–5.28)
SARS-COV-2 RNA PNL SPEC NAA+PROBE: NOT DETECTED
SODIUM SERPL-SCNC: 140 MMOL/L (ref 136–145)
WBC NRBC COR # BLD: 11.44 10*3/MM3 (ref 3.4–10.8)

## 2022-03-16 PROCEDURE — 36415 COLL VENOUS BLD VENIPUNCTURE: CPT

## 2022-03-16 PROCEDURE — 85027 COMPLETE CBC AUTOMATED: CPT

## 2022-03-16 PROCEDURE — U0004 COV-19 TEST NON-CDC HGH THRU: HCPCS

## 2022-03-16 PROCEDURE — C9803 HOPD COVID-19 SPEC COLLECT: HCPCS

## 2022-03-16 PROCEDURE — 80053 COMPREHEN METABOLIC PANEL: CPT

## 2022-03-16 PROCEDURE — 93005 ELECTROCARDIOGRAM TRACING: CPT

## 2022-03-16 PROCEDURE — 93010 ELECTROCARDIOGRAM REPORT: CPT | Performed by: INTERNAL MEDICINE

## 2022-03-18 ENCOUNTER — HOSPITAL ENCOUNTER (OUTPATIENT)
Dept: MAMMOGRAPHY | Facility: HOSPITAL | Age: 72
Discharge: HOME | End: 2022-03-18

## 2022-03-18 ENCOUNTER — LAB REQUISITION (OUTPATIENT)
Dept: LAB | Facility: HOSPITAL | Age: 72
End: 2022-03-18

## 2022-03-18 ENCOUNTER — HOSPITAL ENCOUNTER (OUTPATIENT)
Dept: NUCLEAR MEDICINE | Facility: HOSPITAL | Age: 72
Discharge: HOME | End: 2022-03-18

## 2022-03-18 DIAGNOSIS — C50.311 MALIGNANT NEOPLASM OF LOWER-INNER QUADRANT OF RIGHT FEMALE BREAST, UNSPECIFIED ESTROGEN RECEPTOR STATUS: ICD-10-CM

## 2022-03-18 DIAGNOSIS — C50.311 MALIGNANT NEOPLASM OF LOWER-INNER QUADRANT OF RIGHT FEMALE BREAST: ICD-10-CM

## 2022-03-18 PROCEDURE — 38792 RA TRACER ID OF SENTINL NODE: CPT

## 2022-03-18 PROCEDURE — C1819 TISSUE LOCALIZATION-EXCISION: HCPCS

## 2022-03-18 PROCEDURE — 19281 PERQ DEVICE BREAST 1ST IMAG: CPT | Performed by: RADIOLOGY

## 2022-03-18 PROCEDURE — 0 LIDOCAINE 1 % SOLUTION: Performed by: RADIOLOGY

## 2022-03-18 PROCEDURE — A9541 TC99M SULFUR COLLOID: HCPCS | Performed by: SURGERY

## 2022-03-18 PROCEDURE — 0 TECHNETIUM FILTERED SULFUR COLLOID: Performed by: SURGERY

## 2022-03-18 PROCEDURE — 76098 X-RAY EXAM SURGICAL SPECIMEN: CPT

## 2022-03-18 PROCEDURE — 76098 X-RAY EXAM SURGICAL SPECIMEN: CPT | Performed by: RADIOLOGY

## 2022-03-18 PROCEDURE — 88307 TISSUE EXAM BY PATHOLOGIST: CPT | Performed by: SURGERY

## 2022-03-18 RX ORDER — LIDOCAINE HYDROCHLORIDE 10 MG/ML
5 INJECTION, SOLUTION INFILTRATION; PERINEURAL ONCE
Status: COMPLETED | OUTPATIENT
Start: 2022-03-18 | End: 2022-03-18

## 2022-03-18 RX ADMIN — TECHNETIUM TC 99M SULFUR COLLOID 1 DOSE: KIT at 11:50

## 2022-03-18 RX ADMIN — Medication 0.5 ML: at 09:10

## 2022-03-22 LAB
CYTO UR: NORMAL
LAB AP CASE REPORT: NORMAL
LAB AP CLINICAL INFORMATION: NORMAL
PATH REPORT.FINAL DX SPEC: NORMAL
PATH REPORT.GROSS SPEC: NORMAL

## 2022-03-22 NOTE — PROGRESS NOTES
"  Subjective     PROBLEM LIST:  1. [id number] ER+ (100%) IN+ (50%), Her2 negative (1+) Invasive ductal carcinoma of the right breast  A) right breast lumpectomy on 3/18/22.  Pathology showed a 16 mm intermediate grade invasive ductal carcinoma.  No lymphoid tissue present in submitted sentinel node.  2. Atrial fibrillation  3. DM  4. HL  5. GERD  6. HTN  7. Migraines  8. SVT    CHIEF COMPLAINT: breast cancer      HISTORY OF PRESENT ILLNESS:  The patient is a 71 y.o. female, referred for evaluation of a recent diagnosis of breast cancer.    She underwent right breast lumpectomy last week.  She has a family history of breast cancer in her mother who took tamoxifen.    She lives in [facility] with her .  She is a retired teacher.    REVIEW OF SYSTEMS:  A 14 point review of systems was performed and is negative except as noted above.    Past Medical History:   Diagnosis Date   • Arthritis    • Atrial fibrillation (HCC)    • Breast cancer (HCC)    • DDD (degenerative disc disease), cervical    • DDD (degenerative disc disease), thoracic    • Diabetes mellitus (HCC)    • Dyslipidemia    • GERD (gastroesophageal reflux disease)     GERD/ Hiatal Hernia   • Hyperlipidemia    • Hypertension    • Macular degeneration    • Migraine     Migraine headaches   • PONV (postoperative nausea and vomiting)    • SVT (supraventricular tachycardia) (HCC)    • Tinnitus    • Wears glasses     for distance only               Objective     /63   Pulse 75   Temp 97.4 °F (36.3 °C)   Resp 18   Ht 165.1 cm (65\")   Wt 94.8 kg (209 lb)   LMP  (LMP Unknown)   SpO2 98%   BMI 34.78 kg/m²   Performance Status:  ECOG score: 0           General: well appearing female in no acute distress  Neuro: alert and oriented  HEENT: sclerae anicteric, oropharynx clear  Extremities: no lower extremity edema  Skin: no rashes, lesions, bruising, or petechiae  Psych: mood and affect appropriate    Lab Results   Component Value Date    WBC 11.44 " (H) 03/16/2022    HGB 14.1 03/16/2022    HCT 43.0 03/16/2022    MCV 90.1 03/16/2022     03/16/2022     Lab Results   Component Value Date    GLUCOSE 103 (H) 03/16/2022    BUN 18 03/16/2022    CREATININE 0.86 03/16/2022    EGFRIFNONA 72 08/13/2021    BCR 20.9 03/16/2022    K 4.8 03/16/2022    CO2 25.0 03/16/2022    CALCIUM 10.7 (H) 03/16/2022    ALBUMIN 4.20 03/16/2022    AST 14 03/16/2022    ALT 14 03/16/2022                 Assessment/Plan     Tracy Barrios is a 71 y.o. female with a ER+ Her2 negative IDC of the right breast.    We discussed adjuvant chemotherapy and endocrine therapy.  I would recommend adjuvant endocrine therapy with an aromatase inhibitor.  We reviewed the potential side effects of anastrozole including hot flashes, vaginal dryness, joint pain, decrease in bone density, and mood or sleep disturbance.    She says that she always has reactions to medications and she is not sure that she wants to try taking this.  She feels like she would rather take her chances with cancer recurrence.  She does plan to follow-up with mammogram surveillance.    I will plan to see her once yearly for 5 years.  We will schedule follow-up in my [place] clinic.           A total greater than 45 mins minutes was spent in face to face patient time, examination, counseling, charting, reviewing test results, and reviewing outside records.    Sarah Valles MD    3/23/2022

## 2022-03-23 ENCOUNTER — CONSULT (OUTPATIENT)
Dept: ONCOLOGY | Facility: CLINIC | Age: 72
End: 2022-03-23

## 2022-03-23 ENCOUNTER — HOSPITAL ENCOUNTER (OUTPATIENT)
Dept: RADIATION ONCOLOGY | Facility: HOSPITAL | Age: 72
Setting detail: RADIATION/ONCOLOGY SERIES
Discharge: HOME | End: 2022-03-23

## 2022-03-23 ENCOUNTER — OFFICE VISIT (OUTPATIENT)
Dept: RADIATION ONCOLOGY | Facility: HOSPITAL | Age: 72
End: 2022-03-23

## 2022-03-23 VITALS
DIASTOLIC BLOOD PRESSURE: 63 MMHG | OXYGEN SATURATION: 98 % | HEIGHT: 65 IN | WEIGHT: 209 LBS | TEMPERATURE: 97.4 F | HEART RATE: 75 BPM | RESPIRATION RATE: 18 BRPM | SYSTOLIC BLOOD PRESSURE: 153 MMHG | BODY MASS INDEX: 34.82 KG/M2

## 2022-03-23 VITALS — BODY MASS INDEX: 33.95 KG/M2 | WEIGHT: 204 LBS

## 2022-03-23 DIAGNOSIS — Z17.0 MALIGNANT NEOPLASM OF LOWER-INNER QUADRANT OF RIGHT BREAST OF FEMALE, ESTROGEN RECEPTOR POSITIVE: Primary | ICD-10-CM

## 2022-03-23 DIAGNOSIS — C50.911 MALIGNANT NEOPLASM OF RIGHT BREAST IN FEMALE, ESTROGEN RECEPTOR POSITIVE, UNSPECIFIED SITE OF BREAST: Primary | ICD-10-CM

## 2022-03-23 DIAGNOSIS — C50.311 MALIGNANT NEOPLASM OF LOWER-INNER QUADRANT OF RIGHT BREAST OF FEMALE, ESTROGEN RECEPTOR POSITIVE: Primary | ICD-10-CM

## 2022-03-23 DIAGNOSIS — Z17.0 MALIGNANT NEOPLASM OF RIGHT BREAST IN FEMALE, ESTROGEN RECEPTOR POSITIVE, UNSPECIFIED SITE OF BREAST: Primary | ICD-10-CM

## 2022-03-23 PROCEDURE — 99204 OFFICE O/P NEW MOD 45 MIN: CPT | Performed by: INTERNAL MEDICINE

## 2022-03-23 NOTE — PROGRESS NOTES
CONSULTATION NOTE    NAME:      Tracy Barrios  :                                                          1950  DATE OF CONSULTATION:                       22  REQUESTING PHYSICIAN:                   Marquise Castro MD  REASON FOR CONSULTATION:           Cancer Staging  Malignant neoplasm of right breast in female, estrogen receptor positive (HCC)  Staging form: Breast, AJCC 8th Edition  - Pathologic: Stage IA (pT1c, pN0, cM0, G2, ER+, CA+, HER2-) - Signed by Sarah Valles MD on 3/23/2022         BRIEF HISTORY:  Tracy Barrios  is a very pleasant 71 y.o. female  who underwent right breast lumpectomy of the lower inner quadrant.  She had 16 mm in greatest combined dimension of invasive ductal carcinoma, grade 2.  There were 11 mm in the resection specimen and 5 mm on the biopsy specimen.  Tumor was at the 5 o'clock position.  Invasive margins were negative by 4 mm and DCIS by 2 mm.  Tumor was ER/CA positive HER-2/gerber negative.  MRI had shown the biopsy-proven IDC in the right lower inner quadrant 5 o'clock position.  There was no evidence of multifocal or multicentric disease in the right breast.  The left breast was benign.  There was no evidence of axillary or internal mammary lymphadenopathy.   Ms. Barrios is here to discuss postoperative radiation.  Of note she has history of bilateral reduction mammoplasty.  Ms. Barrios enjoys quilting.  She and her  enjoy their friends.  Of note, Ms. Barrios and her  are unvaccinated for COVID-19 and were both ill with the delta and then omicron variants.          Age at menarche:  14  Age at menopause:  hyst age 42  Hormone replacement therapy:  yes, briefly for 2-3 months after surgery  Personal history of breast cancer:  no  Family history of breast cancer:  yes; family member mother at the same age  Radiation to chest before age of 30:  no  Age of first live birth:  20      BMI:  Body mass index is 33.95 kg/m².      Social History      Substance and Sexual Activity   Alcohol Use No       Allergies   Allergen Reactions   • Levofloxacin Anaphylaxis   • Statins Myalgia     muscle pain   • Penicillins Rash       Social History     Tobacco Use   • Smoking status: Never Smoker   • Smokeless tobacco: Never Used   Vaping Use   • Vaping Use: Never used   Substance Use Topics   • Alcohol use: No   • Drug use: No         Past Medical History:   Diagnosis Date   • Arthritis    • Atrial fibrillation (HCC)    • Breast cancer (HCC)    • DDD (degenerative disc disease), cervical    • DDD (degenerative disc disease), thoracic    • Diabetes mellitus (HCC)    • Dyslipidemia    • GERD (gastroesophageal reflux disease)     GERD/ Hiatal Hernia   • Hyperlipidemia    • Hypertension    • Macular degeneration    • Migraine     Migraine headaches   • PONV (postoperative nausea and vomiting)    • SVT (supraventricular tachycardia) (HCC)    • Tinnitus    • Wears glasses     for distance only       family history includes Breast cancer (age of onset: 50) in her mother; Cancer in her maternal grandmother and mother; Coronary artery disease in her father; Heart attack in her father; Hypertension in her mother.     Past Surgical History:   Procedure Laterality Date   • ABLATION OF DYSRHYTHMIC FOCUS      treatment for A-fib was successful   • APPENDECTOMY     • BLADDER SURGERY      bladder tack   • BREAST BIOPSY Right 1990    Ultrasound guided   • BREAST LUMPECTOMY Right 03/18/2022   • CARDIAC CATHETERIZATION     • HYSTERECTOMY  1991    Total w/BSO/complete   • KNEE ARTHROSCOPY Right 10/30/2020    Procedure: Right knee diagnostic arthroscopy partial lateral meniscectomy;  Surgeon: Robert Saeed MD;  Location: [facility];  Service: Orthopedics;  Laterality: Right;   • OOPHORECTOMY Bilateral 1991   • REDUCTION MAMMAPLASTY Bilateral 1992   • TONSILLECTOMY     • TOTAL KNEE ARTHROPLASTY Right 08/12/2021    Procedure: TOTAL KNEE REPLACEMENT RIGHT;  Surgeon: Lul Banks  MD Nate;  [facility];  Service: Orthopedics;  Laterality: Right;   • VAGINAL DELIVERY      x2        Review of Systems   Eyes:        Wears glasses   Musculoskeletal: Positive for arthralgias, back pain and myalgias.   All other systems reviewed and are negative.          Objective   VITAL SIGNS:   Vitals:    03/23/22 0959   Weight: 92.5 kg (204 lb)  Comment: stated   PainSc: 0-No pain        KPS       90%    Physical Exam  Vitals reviewed.   Constitutional:       Appearance: Normal appearance.   Cardiovascular:      Rate and Rhythm: Normal rate and regular rhythm.      Heart sounds: Normal heart sounds.   Pulmonary:      Effort: Pulmonary effort is normal.      Breath sounds: Normal breath sounds.   Neurological:      Mental Status: She is alert.       The breast exam reveals evidence of previous reduction mammoplasty.  She has a well-healing surgical incision from the 1-5 o'clock region of the right breast adjacent to the nipple areolar complex.  She has the previous scars surrounding the nipple areolar complexes bilaterally as well as an area that waxes and wanes at the 5 o'clock position of the right breast near the nipple areolar complex.  She has mild ecchymosis medially in the right breast.  She has no axillary adenopathy bilaterally.       The following portions of the patient's history were reviewed and updated as appropriate: allergies, current medications, past family history, past medical history, past social history, past surgical history and problem list.    Assessment      IMPRESSION:     Tracy Barrios  is a 71 y.o. female  who underwent right breast lumpectomy of the lower inner quadrant.  She had 16 mm in greatest combined dimension of invasive ductal carcinoma, grade 2.  There were 11 mm in the resection specimen and 5 mm on the biopsy specimen.  Tumor was at the 5 o'clock position.  Invasive margins were negative by 4 mm and DCIS by 2 mm.  Tumor was ER/OH positive  HER-2/gerber negative.          RECOMMENDATIONS: I discussed the pros and cons, risks and benefits of postoperative radiation to Ms. Barrios and her .  She had a small, grade 2 tumor with clinically negative right lymph nodes.  The sentinel node was not obtained due to scarring of the breast from previous surgery.  The nodes were clinically negative.  With these characteristics in women 70 or older there is only a small benefit of postoperative radiation if she takes endocrine therapy.  Ms. Barrios does not want to undergo radiation.  She will meet with Dr. Sarah Valles regarding endocrine therapy.  If she does not tolerate endocrine therapy or refuses it she can call me at any time for postoperative radiation.  This was explained to her.  Thank you for the opportunity to see Mrs. Barrios.               Luz Elmore MD    Total time of patient care on day of service including time prior to, face to face with patient, and following visit spent in reviewing records, lab results, imaging studies, discussion with patient, and documentation/charting was > 45 minutes.    Errors in dictation may reflect use of voice recognition software and not all errors in transcription may have been detected prior to signing.

## 2022-03-28 ENCOUNTER — TRANSCRIBE ORDERS (OUTPATIENT)
Dept: MAMMOGRAPHY | Facility: HOSPITAL | Age: 72
End: 2022-03-28

## 2022-03-28 DIAGNOSIS — C50.311 MALIGNANT NEOPLASM OF LOWER-INNER QUADRANT OF RIGHT FEMALE BREAST, UNSPECIFIED ESTROGEN RECEPTOR STATUS: Primary | ICD-10-CM

## 2022-03-29 ENCOUNTER — DOCUMENTATION (OUTPATIENT)
Dept: GENETICS | Facility: HOSPITAL | Age: 72
End: 2022-03-29

## 2022-03-29 NOTE — PROGRESS NOTES
Tracy Barrios is a 71-year-old female who was seen for genetic counseling due to a personal history of breast cancer.   Ms. Barrios was recently diagnosed with an ER/GA+ breast cancer at age 71.  She is post-menopausal and has had a hysterectomy and bilateral salpingo-oophorectomy.  Ms. Barrios was interested in discussing her risk for a hereditary cancer syndrome, and decided to pursue genetic testing.   Ms. Barrios opted to pursue comprehensive testing via the CancerNext panel ordered through [employer] which includes BRCA1/2 and 34 additional genes associated with an increased risk of breast cancer or other cancers (APC, MICHELE, AXIN2, BARD1, BMPR1A, BRCA1, BRCA2, BRIP1, CDH1, CDK4, CDKN2A, CHEK2, DICER1, EPCAM, GREM1, HOXB13, MLH1, MRE11A, MSH2, MSH3, MSH6, MUTYH, NBN, NF1, NTHL1, PALB2, PMS2, POLD1, POLE, PTEN, RAD51C, RAD51D, RECQL, SMAD4, SMARCA4, STK11, and TP53).  Ms. 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 Testing was negative for mutations in any of the other 35 genes evaluated.  These results were discussed with Ms. Barrios by telephone on 3/29/22.     PERTINENT FAMILY HISTORY:  Mother:  Breast cancer, 71  Mat. Grandmother: Ovarian cancer, 52  Father:   Prostate cancer, 71  Pat. Uncle:  Metastatic prostate cancer  Pat. Aunt:   Lung cancer  Pat. Grandfather:  Lung cancer     RISK ASSESSMENT:  Ms. Barrios’ personal history of breast cancer in combination with her family history raises the question of a hereditary cancer syndrome.   NCCN guidelines recommend BRCA1/2 testing for individuals diagnosed with breast cancer at any if they have a close relative with ovarian cancer, or two or more close relatives on the same side of the family diagnosed with prostate cancer.  Therefore, testing is appropriate for Ms. Barrios.  We discussed that the standard approach to BRCA1/2  testing is via a multigene panel that evaluates BRCA1/2 and multiple other genes known to impact cancer risk.  This risk assessment is based on the family history information provided at the time of the appointment.  The assessment could change in the future should new information be obtained.     GENETIC COUNSELING: We reviewed the family history information in detail.  Cases of breast cancer follow three general patterns: sporadic, familial, and hereditary.  While most cancer is sporadic (75-80%), some cases appear to occur in family clusters.  These cases are said to be familial and account for 10-20% of breast cancer cases.  Familial cases may be due to a combination of shared genes and environmental factors among family members.  In even fewer cases (5-10%), the risk for cancer is inherited, and the genes responsible for the increased cancer risk are known.       Family histories typical of hereditary cancer syndromes usually include multiple first- and second-degree relatives diagnosed with cancer types that define a syndrome.  These cases tend to be diagnosed at younger-than-expected ages and can be bilateral or multifocal.  The cancer in these families follows an autosomal dominant inheritance pattern, which indicates the likely presence of a mutation in a cancer susceptibility gene.  Children and siblings of an individual believed to carry this mutation have a 50% chance of inheriting that mutation, thereby inheriting the increased risk to develop cancer.  These mutations can be passed down from the maternal or the paternal lineage.     Hereditary breast cancer accounts for 5-10% of all cases of breast cancer.  A significant proportion of hereditary breast cancer can be attributed to mutations in the BRCA1 and BRCA2 genes.  Mutations in these genes confer an increased risk for breast cancer, ovarian cancer, male breast cancer, prostate cancer and pancreatic cancer.  There are other genes that are known to  be associated with an increased risk for breast cancer and other cancers. Based on Ms. Barrios’s desire to get as much information as possible regarding her personal risks and potential risks for her family, she opted to pursue testing through a panel that would look at several other genes known to increase the risk for breast cancer.     GENETIC TESTING:  The risks, benefits and limitations of genetic testing and implications for clinical management following testing were reviewed.  DNA test results can influence decisions regarding screening, prevention and surgical management.  Genetic testing can have significant psychological implications for both individuals and families.  Also discussed was the possibility of insurance discrimination based on genetic test results and the laws ([place]) in place to prevent this.     We discussed panel testing, which would involve testing for BRCA1/2 as well as several other cancer susceptibility genes at the same time.  The benefits and limitations of genetic testing were discussed and Ms. Barrios decided to pursue testing. The implications of a positive or negative test result were discussed. We discussed the possibility that, in some cases, genetic test results may be uninformative due to the identification of a genetic variant of uncertain significance. These variants may or may not be associated with an increased cancer risk.  VUSs are frequently reported through multigene panel testing, given the presence of genetic variation in the population and the number of genes being analyzed. Given her personal history, a negative test result would not eliminate all breast cancer risk to her relatives, although the risk would not be as high as it would with positive genetic testing.       TEST RESULTS:  Genetic testing was positive for one pathogenic mutation ([id number]) in the MUTYH gene (see attached results).  The presence of one MUTYH mutation indicates that Ms. Barrios does NOT have  MUTYH-Associated Polyposis (MAP), but is a carrier.  Unlike most hereditary cancer syndromes, MAP is inherited in an autosomal recessive manner; therefore, Ms. Barrios being identified as having one MUTYH mutation does not place her at the significantly increased cancer risks that are seen when someone has two mutations, one in each copy of the gene.  The risk of colon cancer in individuals with one MUTYH mutation is unclear. The risk of colon cancer was not found to be significantly increased in large population-based studies, whereas family-based studies found a somewhat increased risk of colon cancer, ranging from a 5.6-10% lifetime risk in comparison to the general population risk of 3-5%.      Based on Ms. Barrios’s test results, her siblings and children has a 50% chance to have inherited the MUTYH mutation identified. The general population carrier frequency is 1-2%; therefore, it is possible for Ms. Barrios’ family members to have inherited an additional mutation from the other parent. Therefore, rather than just performing single site analysis for the identified mutation, full MUTYH analysis is indicated for family members. Genetic counseling and testing could be considered for Ms. Barrios’s family members.  For individuals found to have two MUTYH mutations, screening colonoscopy begins at age 25.  Genetic testing for adult onset conditions is not recommended before age 18. We would be happy to see family members who live in the area in our clinic to further discuss this information and testing options. Relatives can call our office at [phone] for assistance in scheduling an appointment; however, a physician referral to our office will prompt our coordinator to contact patients to schedule an appointment.  For family members who live elsewhere, there are genetic counselors at most [facility]. They can find a genetic counselor by visiting the National Society of Genetic Counselors website at  www.nsgc.org.  Relatives would need a copy of Ms. Barrios’ genetic test result to ensure they were being tested for the correct gene.    SURVEILLANCE: Per NCCN guidelines, individuals who are carriers of one MUTYH mutation and have a family history of colon cancer in a first-degree relative should begin screening colonoscopy at age 40 or ten years prior to the youngest colorectal cancer diagnosis in the family and repeated every five years.  Ms. Barrios does not have a known history of colon cancer in a first-degree relative.  General population screening colonoscopy is recommended at age 45.      Based on Ms. Barrios’s personal and family history, her female relatives may have an increased lifetime risk for breast cancer.  Relatives may have a personalized risk assessment performed to quantify their breast cancer risk using a family history based risk model, such as the Tyrer-Cuzick model.  According to an American Cancer Society expert panel and NCCN guidelines, annual breast MRI in addition to annual mammogram should be offered to women whose lifetime risk of breast cancer is 20-25 percent or more, starting by age 40 or 10 years prior to the earliest diagnosis in the family.      PLAN: Genetic counseling remains available to Ms. Barrios and her family. Ms. Barrios should feel free to contact us with any questions or concerns at [phone].        Mandy Oreilly MS, [facility], [facility]  Licensed Certified Genetic Counselor    Cc: Marquise Castro MD   Tracy Barrios

## 2022-08-02 ENCOUNTER — TRANSCRIBE ORDERS (OUTPATIENT)
Dept: ADMINISTRATIVE | Facility: HOSPITAL | Age: 72
End: 2022-08-02

## 2022-08-02 DIAGNOSIS — R10.11 ABDOMINAL PAIN, RUQ: Primary | ICD-10-CM

## 2022-08-26 ENCOUNTER — HOSPITAL ENCOUNTER (OUTPATIENT)
Dept: GENERAL RADIOLOGY | Facility: HOSPITAL | Age: 72
Discharge: HOME | End: 2022-08-26
Admitting: INTERNAL MEDICINE

## 2022-08-26 ENCOUNTER — TRANSCRIBE ORDERS (OUTPATIENT)
Dept: GENERAL RADIOLOGY | Facility: HOSPITAL | Age: 72
End: 2022-08-26

## 2022-08-26 DIAGNOSIS — M54.6 THORACIC BACK PAIN, UNSPECIFIED BACK PAIN LATERALITY, UNSPECIFIED CHRONICITY: ICD-10-CM

## 2022-08-26 DIAGNOSIS — M54.50 LUMBAR BACK PAIN: ICD-10-CM

## 2022-08-26 DIAGNOSIS — M54.50 LUMBAR BACK PAIN: Primary | ICD-10-CM

## 2022-08-26 PROCEDURE — 72072 X-RAY EXAM THORAC SPINE 3VWS: CPT

## 2022-08-26 PROCEDURE — 72100 X-RAY EXAM L-S SPINE 2/3 VWS: CPT

## 2022-09-09 ENCOUNTER — HOSPITAL ENCOUNTER (OUTPATIENT)
Dept: ULTRASOUND IMAGING | Facility: HOSPITAL | Age: 72
Discharge: HOME | End: 2022-09-09
Admitting: INTERNAL MEDICINE

## 2022-09-09 DIAGNOSIS — R10.11 ABDOMINAL PAIN, RUQ: ICD-10-CM

## 2022-09-09 PROCEDURE — 76705 ECHO EXAM OF ABDOMEN: CPT

## 2022-10-03 ENCOUNTER — HOSPITAL ENCOUNTER (OUTPATIENT)
Dept: ULTRASOUND IMAGING | Facility: HOSPITAL | Age: 72
Discharge: HOME | End: 2022-10-03

## 2022-10-03 ENCOUNTER — HOSPITAL ENCOUNTER (OUTPATIENT)
Dept: MAMMOGRAPHY | Facility: HOSPITAL | Age: 72
Discharge: HOME | End: 2022-10-03

## 2022-10-03 DIAGNOSIS — C50.311 MALIGNANT NEOPLASM OF LOWER-INNER QUADRANT OF RIGHT FEMALE BREAST, UNSPECIFIED ESTROGEN RECEPTOR STATUS: ICD-10-CM

## 2022-10-03 PROCEDURE — 76642 ULTRASOUND BREAST LIMITED: CPT | Performed by: RADIOLOGY

## 2022-10-03 PROCEDURE — 76642 ULTRASOUND BREAST LIMITED: CPT

## 2022-10-03 PROCEDURE — 77066 DX MAMMO INCL CAD BI: CPT | Performed by: RADIOLOGY

## 2022-10-03 PROCEDURE — 77062 BREAST TOMOSYNTHESIS BI: CPT | Performed by: RADIOLOGY

## 2022-10-03 PROCEDURE — G0279 TOMOSYNTHESIS, MAMMO: HCPCS

## 2022-10-03 PROCEDURE — 77066 DX MAMMO INCL CAD BI: CPT

## 2022-11-01 ENCOUNTER — TELEPHONE (OUTPATIENT)
Dept: ONCOLOGY | Facility: CLINIC | Age: 72
End: 2022-11-01

## 2022-11-01 NOTE — TELEPHONE ENCOUNTER
Caller: Prerna Barrios    Relationship: Self    Best call back number: [phone]    What was the call regarding: PRERNA CALLED AND CANCELLED HER APPT FOR 11/08. SHE DID NOT TAKE THE 5 YEAR MEDICATION, SO DOES NOT WANT TO R/S.

## 2023-05-04 ENCOUNTER — OFFICE VISIT (OUTPATIENT)
Dept: OBSTETRICS AND GYNECOLOGY | Facility: CLINIC | Age: 73
End: 2023-05-04
Payer: COMMERCIAL

## 2023-05-04 VITALS
DIASTOLIC BLOOD PRESSURE: 98 MMHG | WEIGHT: 216 LBS | HEIGHT: 65 IN | SYSTOLIC BLOOD PRESSURE: 158 MMHG | BODY MASS INDEX: 35.99 KG/M2

## 2023-05-04 DIAGNOSIS — N90.7 SEBACEOUS CYST OF LABIA: Primary | ICD-10-CM

## 2023-05-04 RX ORDER — HYDROCHLOROTHIAZIDE 12.5 MG/1
CAPSULE, GELATIN COATED ORAL
COMMUNITY
End: 2023-05-04

## 2023-05-04 RX ORDER — MELOXICAM 15 MG/1
TABLET ORAL DAILY
COMMUNITY

## 2023-05-04 RX ORDER — LISINOPRIL 5 MG/1
TABLET ORAL DAILY
COMMUNITY
End: 2023-05-04

## 2023-05-04 NOTE — PROGRESS NOTES
"GYN Office Visit    Subjective   Chief Complaint   Patient presents with   • Gynecologic Exam     Vaginal bumps- Patient has had the lesions for several years.      Tracy Barrios is a 72 y.o.  presenting to be evaluated for vaginal bumps that have been present for 3-4 years at least. She reports they are hard and sometimes itchy. She recently developed a boil on the right side, which she used warm compresses on and it eventually drained spontaneously. She noticed that after resolution of the boil, the bumps on the right side resolved. However, the left sided bumps are still present. She has been thinking they would resolve but since they have not, she presents today for evaluation. She believes they are inside the vagina rather than out on the labia. Denies VB since her hysterectomy/ BSO. She underwent hysterectomy due to AUB. She denies abnormal vaginal discharge.    OB Hx:   OB History    Para Term  AB Living   2 2 2     2   SAB IAB Ectopic Molar Multiple Live Births             2      # Outcome Date GA Lbr Warren/2nd Weight Sex Delivery Anes PTL Lv   2 Term      Vag-Spont   LESTER   1 Term      Vag-Spont   LESTER      Pap smear: all normal prior to hysterectomy  Mammogram: 10/2022, BI-RADS 3. Next scheduled 23.  Colonoscopy: has had one, was told not to repeat it due to \"twisting\" in her bowel that would make her high risk for perforation. Does cologuard - normal.  DEXA Scan: 10/2021, consistent with osteopenia. Currently on Vit D and calcium.    Past Medical History:   Diagnosis Date   • Arthritis    • Atrial fibrillation    • Breast cancer    • DDD (degenerative disc disease), cervical    • DDD (degenerative disc disease), thoracic    • Diabetes mellitus    • Dyslipidemia    • GERD (gastroesophageal reflux disease)     GERD/ Hiatal Hernia   • Hyperlipidemia    • Hypertension    • Macular degeneration    • Migraine     Migraine headaches   • PONV (postoperative nausea and vomiting)    • " SVT (supraventricular tachycardia)    • Tinnitus    • Wears glasses     for distance only     Past Surgical History:   Procedure Laterality Date   • ABLATION OF DYSRHYTHMIC FOCUS      treatment for A-fib was successful   • APPENDECTOMY     • BLADDER SURGERY      bladder tack   • BREAST BIOPSY Right 1990    Ultrasound guided   • BREAST LUMPECTOMY Right 03/18/2022   • CARDIAC CATHETERIZATION     • HYSTERECTOMY  1991    Total w/BSO/complete   • KNEE ARTHROSCOPY Right 10/30/2020    Procedure: Right knee diagnostic arthroscopy partial lateral meniscectomy;  Surgeon: Robert Saeed MD;  Location: [facility];  Service: Orthopedics;  Laterality: Right;   • OOPHORECTOMY Bilateral 1991   • REDUCTION MAMMAPLASTY Bilateral 1992   • TONSILLECTOMY     • TOTAL KNEE ARTHROPLASTY Right 08/12/2021    Procedure: TOTAL KNEE REPLACEMENT RIGHT;  Surgeon: Lul Banks MD;  Location: [facility];  Service: Orthopedics;  Laterality: Right;   • VAGINAL DELIVERY      x2     Family History   Problem Relation Age of Onset   • Heart attack Father    • Coronary artery disease Father    • Liver cancer Father    • Breast cancer Mother 50   • Hypertension Mother    • Alzheimer's disease Mother    • Ovarian cancer Maternal Grandmother       Social History     Tobacco Use   • Smoking status: Never   • Smokeless tobacco: Never   Vaping Use   • Vaping Use: Never used   Substance Use Topics   • Alcohol use: No   • Drug use: No     Allergies   Allergen Reactions   • Levofloxacin Anaphylaxis   • Statins Myalgia     muscle pain   • Penicillins Rash     Current Outpatient Medications on File Prior to Visit   Medication Sig Dispense Refill   • baclofen (LIORESAL) 10 MG tablet Take 1 tablet by mouth 3 (Three) Times a Day. 270 tablet 0   • EPINEPHrine (EPIPEN) 0.3 MG/0.3ML solution auto-injector injection Administer 1 pen injector intramuscularly single dose as needed for anaphylaxis.  Call 911 if used (Patient taking differently: Inject 0.3 mg under  "the skin into the appropriate area as directed 1 (One) Time.) 2 each 1   • lisinopril (PRINIVIL,ZESTRIL) 2.5 MG tablet Take 1 tablet by mouth Daily for blood pressure 90 tablet 3   • meloxicam (MOBIC) 15 MG tablet Take  by mouth Daily.     • metFORMIN ER (GLUCOPHAGE-XR) 500 MG 24 hr tablet Take 1 tablet by mouth Daily. 90 tablet 3   • mupirocin (BACTROBAN) 2 % ointment Apply topically to the appropriate area twice daily as directed. 22 g 1   • pantoprazole (PROTONIX) 40 MG EC tablet Take 1 tablet by mouth Daily. 90 tablet 3   • [DISCONTINUED] azithromycin (ZITHROMAX) 250 MG tablet Take 2 tablets by mouth today, then take 1 tablet daily for 4 additional days 6 tablet 0   • [DISCONTINUED] Bempedoic Acid (Nexletol) 180 MG tablet Take 1 tablet by mouth Daily for cholesterol 30 tablet 5   • [DISCONTINUED] Bempedoic Acid (Nexletol) 180 MG tablet Take 1 tablet by mouth Daily for cholesterol 30 tablet 5   • [DISCONTINUED] fluticasone (VERAMYST) 27.5 MCG/SPRAY nasal spray Spray 1-2 sprays into the nostril(s) once daily as directed by provider. (Patient taking differently: 1 spray into the nostril(s) as directed by provider Daily As Needed for Rhinitis or Allergies.) 5.9 mL 8   • [DISCONTINUED] hydroCHLOROthiazide (MICROZIDE) 12.5 MG capsule hydrochlorothiazide 12.5 mg capsule     • [DISCONTINUED] HYDROcodone-acetaminophen (NORCO) 5-325 MG per tablet Take 1 tablet by mouth Every 8 (Eight) Hours As Needed. 6 tablet 0   • [DISCONTINUED] lisinopril (PRINIVIL,ZESTRIL) 5 MG tablet Take  by mouth Daily.     • [DISCONTINUED] meloxicam (MOBIC) 15 MG tablet Take 1 tablet by mouth Daily As Needed. 90 tablet 2   • [DISCONTINUED] metFORMIN ER (GLUCOPHAGE-XR) 500 MG 24 hr tablet Take 1 tablet by mouth Daily. 90 tablet 3     No current facility-administered medications on file prior to visit.     Social History    Tobacco Use      Smoking status: Never      Smokeless tobacco: Never       Objective   /98   Ht 165.1 cm (65\")   Wt 98 " kg (216 lb)   LMP  (LMP Unknown)   BMI 35.94 kg/m²     Physical Exam:  General Appearance: alert, interactive, and NAD  Pelvis:  Pelvic: Clinical staff was present for exam  External genitalia:  Normal hair distribution, multiple scattered sebaceous cysts, some with mild yellow coloring, present bilaterally on the labia majora. In the left labia majora, there is a 1.5 x 1.0 cm mildly erythematous lesion that is somewhat tender to touch and feels fluctuant, however on deeper palpation feels as if it is comprised of two pea-sized cysts. An attempt was made to aspirate the lesion but unsuccessful; no drainage could be obtained.  :  urethral meatus normal;  Rectal:  digital rectal exam not performed; anus visually normal appearing       Medical Decision Making:    Assessment & Plan      Diagnosis Plan   1. Sebaceous cyst of labia           Medication Management: None    Procedures Performed: Attempted to aspirate/ drain the largest cyst in the left labia majora, unable to produce any drainage.    We reviewed exam findings, which are most consistent with benign sebaceous cysts, the majority of which are < 1 cm. They are present bilaterally. The largest cyst seemed somewhat fluctuant but did not drain with attempted aspiration. Tracy is leaving on vacation tomorrow and plans to be in the pool, thus it was decided not to further pursue I&D. She is instructed to use warm compresses and call the office if she develops worsening swelling or tenderness, as she may require antibiotics. When she returns, we will either drain the larger cyst or obtain a biopsy to confirm suspected diagnosis -- given symptomatic itching I would like pathologic confirmation these are benign. All questions answered.     RTC in 3 weeks for I&D vs biopsy    Dara Constantino MD  Obstetrics and Gynecology  [facility]    30 minutes were spent in chart review, discussing patient's symptoms, examining patient and completing the chart.

## 2023-05-26 ENCOUNTER — HOSPITAL ENCOUNTER (OUTPATIENT)
Dept: MAMMOGRAPHY | Facility: HOSPITAL | Age: 73
Discharge: HOME | End: 2023-05-26
Payer: COMMERCIAL

## 2023-05-26 DIAGNOSIS — R92.8 ABNORMAL MAMMOGRAM: ICD-10-CM

## 2023-05-26 PROCEDURE — 77065 DX MAMMO INCL CAD UNI: CPT

## 2023-05-26 PROCEDURE — G0279 TOMOSYNTHESIS, MAMMO: HCPCS

## 2023-11-21 ENCOUNTER — OFFICE VISIT (OUTPATIENT)
Dept: UROLOGY | Facility: CLINIC | Age: 73
End: 2023-11-21
Payer: COMMERCIAL

## 2023-11-21 ENCOUNTER — TELEPHONE (OUTPATIENT)
Dept: UROLOGY | Facility: CLINIC | Age: 73
End: 2023-11-21

## 2023-11-21 VITALS
HEIGHT: 65 IN | WEIGHT: 200 LBS | DIASTOLIC BLOOD PRESSURE: 90 MMHG | TEMPERATURE: 97.3 F | HEART RATE: 83 BPM | SYSTOLIC BLOOD PRESSURE: 138 MMHG | BODY MASS INDEX: 33.32 KG/M2 | OXYGEN SATURATION: 96 %

## 2023-11-21 DIAGNOSIS — N39.0 RECURRENT UTI: Primary | ICD-10-CM

## 2023-11-21 DIAGNOSIS — N95.8 GENITOURINARY SYNDROME OF MENOPAUSE: ICD-10-CM

## 2023-11-21 LAB
BILIRUB BLD-MCNC: ABNORMAL MG/DL
CLARITY, POC: CLEAR
COLOR UR: YELLOW
EXPIRATION DATE: ABNORMAL
GLUCOSE UR STRIP-MCNC: NEGATIVE MG/DL
KETONES UR QL: NEGATIVE
LEUKOCYTE EST, POC: ABNORMAL
Lab: ABNORMAL
NITRITE UR-MCNC: NEGATIVE MG/ML
PH UR: 5 [PH] (ref 5–8)
PROT UR STRIP-MCNC: ABNORMAL MG/DL
RBC # UR STRIP: ABNORMAL /UL
SP GR UR: 1.02 (ref 1–1.03)
UROBILINOGEN UR QL: NORMAL

## 2023-11-21 RX ORDER — NITROFURANTOIN 25; 75 MG/1; MG/1
100 CAPSULE ORAL NIGHTLY
Qty: 90 CAPSULE | Refills: 1 | Status: SHIPPED | OUTPATIENT
Start: 2023-11-21

## 2023-11-21 RX ORDER — ESTRADIOL 0.1 MG/G
CREAM VAGINAL
Qty: 42.5 G | Refills: 3 | Status: SHIPPED | OUTPATIENT
Start: 2023-11-21

## 2023-11-21 RX ORDER — NITROFURANTOIN 25; 75 MG/1; MG/1
100 CAPSULE ORAL NIGHTLY
Qty: 14 CAPSULE | Refills: 0 | Status: SHIPPED | OUTPATIENT
Start: 2023-11-21 | End: 2023-11-21 | Stop reason: SDUPTHER

## 2023-11-21 NOTE — TELEPHONE ENCOUNTER
Provider: ABHISHEK TIDWELL    Caller: SophieTracy    Relationship to Patient: Self     Phone Number: [phone]    Reason for Call: MACROBID    When was the patient last seen: TODAY (11/21/23)    Notes:  MS. CANO PICKED UP HER MACROBID, BUT THE PRESCRIPTION IS ONLY FOR 14 DAYS. PATIENT THOUGHT SHE WAS PICKING UP A 90-DAY SUPPLY. PLEASE CALL MS. CANO TO DISCUSS.     PREFERRED PHARMACY:  [facility] [address] [address]  Phone: [phone]  Fax: [phone]

## 2023-11-21 NOTE — PROGRESS NOTES
Office Visit UTI Recurrent      Patient Name: Tracy Barrios  : 1950   MRN: [id number]     Chief Complaint: History of Frequent UTI.    Chief Complaint   Patient presents with    Urinary Tract Infection     Recurrent       Referring Provider: Alireza Stanford MD    History of Present Illness: Tracy Barrios is a 73 y.o. female who presents today for history of multiple UTIs.  Culture proven E. Coli  H/o breast ca 1.5 years ago  Patient reports approximately 4-5 infections in the last 6 months.     UTI symptoms include burning with urination, urgency and microscopic blood    Patient Denies current Sx  Her symptoms resolve promptly when antibiotics are initiated for an episode thought to be an infection.    No History of inpatient hospitalized for these infections?    Yes Records of positive urine cultures?  No relationship with the onset of these infections and sexual activity?  No use of barrier contraception or a spermicidal products?   Yes ongoing problems with constipation?     No urinary incontinence?            3-4 Glasses of water per day         No History of gross hematuria?   No       Vaginal dryness?     Subjective      Review of System:   As noted in HPI      Past Medical History:   Past Medical History:   Diagnosis Date    Arthritis     Atrial fibrillation     Breast cancer     DDD (degenerative disc disease), cervical     DDD (degenerative disc disease), thoracic     Diabetes mellitus     Dyslipidemia     GERD (gastroesophageal reflux disease)     GERD/ Hiatal Hernia    Hyperlipidemia     Hypertension     Macular degeneration     Migraine     Migraine headaches    PONV (postoperative nausea and vomiting)     SVT (supraventricular tachycardia)     Tinnitus     Wears glasses     for distance only       Past Surgical History:   Past Surgical History:   Procedure Laterality Date    ABLATION OF DYSRHYTHMIC FOCUS      treatment for A-fib was successful    APPENDECTOMY      BLADDER  SURGERY      bladder tack    BREAST BIOPSY Right 1990    Ultrasound guided    BREAST LUMPECTOMY Right 03/18/2022    CARDIAC CATHETERIZATION      HYSTERECTOMY  1991    Total w/BSO/complete    KNEE ARTHROSCOPY Right 10/30/2020    Procedure: Right knee diagnostic arthroscopy partial lateral meniscectomy;  Surgeon: Robert Saeed MD;  [facility];  Service: Orthopedics;  Laterality: Right;    OOPHORECTOMY Bilateral 1991    REDUCTION MAMMAPLASTY Bilateral 1992    TONSILLECTOMY      TOTAL KNEE ARTHROPLASTY Right 08/12/2021    Procedure: TOTAL KNEE REPLACEMENT RIGHT;  Surgeon: Lul Banks MD;  Location: [facility] OR;  Service: Orthopedics;  Laterality: Right;    VAGINAL DELIVERY      x2       Family History:   Family History   Problem Relation Age of Onset    Heart attack Father     Coronary artery disease Father     Liver cancer Father     Breast cancer Mother 50    Hypertension Mother     Alzheimer's disease Mother     Ovarian cancer Maternal Grandmother        Social History:   Social History     Socioeconomic History    Marital status:    Tobacco Use    Smoking status: Never    Smokeless tobacco: Never   Vaping Use    Vaping Use: Never used   Substance and Sexual Activity    Alcohol use: No    Drug use: No    Sexual activity: Not Currently     Partners: Male     Birth control/protection: Hysterectomy       Medications:     Current Outpatient Medications:     baclofen (LIORESAL) 10 MG tablet, Take 1 tablet by mouth 3 (Three) Times a Day., Disp: 90 tablet, Rfl: 1    EPINEPHrine (EPIPEN) 0.3 MG/0.3ML solution auto-injector injection, Administer 1 pen injector intramuscularly single dose as needed for anaphylaxis.  Call 911 if used (Patient taking differently: Inject 0.3 mL under the skin into the appropriate area as directed 1 (One) Time.), Disp: 2 each, Rfl: 1    ezetimibe (ZETIA) 10 MG tablet, Take 1 tablet by mouth Every Night., Disp: 90 tablet, Rfl: 1    lisinopril (PRINIVIL,ZESTRIL) 2.5 MG  tablet, Take 1 tablet by mouth Daily., Disp: 90 tablet, Rfl: 1    meloxicam (MOBIC) 15 MG tablet, Take 1 tablet by mouth Daily., Disp: 90 tablet, Rfl: 1    metFORMIN ER (GLUCOPHAGE-XR) 500 MG 24 hr tablet, Take 1 tablet by mouth Daily., Disp: 90 tablet, Rfl: 1    nitrofurantoin, macrocrystal-monohydrate, (Macrobid) 100 MG capsule, Take 1 capsule by mouth Every Night., Disp: 90 capsule, Rfl: 1    pantoprazole (PROTONIX) 40 MG EC tablet, Take 1 tablet by mouth Daily., Disp: 90 tablet, Rfl: 1    amoxicillin (AMOXIL) 500 MG capsule, Take 1 capsule by mouth Every 8 (Eight) Hours until gone (Patient not taking: Reported on 11/21/2023), Disp: 30 capsule, Rfl: 0    baclofen (LIORESAL) 10 MG tablet, Take 1 tablet by mouth 3 (Three) Times a Day. (Patient not taking: Reported on 11/21/2023), Disp: 270 tablet, Rfl: 0    estradiol (ESTRACE) 0.1 MG/GM vaginal cream, Apply 0.5 gm vaginally twice weekly at bedtime, Disp: 42.5 g, Rfl: 3    lisinopril (PRINIVIL,ZESTRIL) 2.5 MG tablet, Take 1 tablet by mouth Daily for blood pressure (Patient not taking: Reported on 11/21/2023), Disp: 90 tablet, Rfl: 3    lisinopril (PRINIVIL,ZESTRIL) 2.5 MG tablet, Take 1 tablet by mouth Daily., Disp: 90 tablet, Rfl: 1    meloxicam (MOBIC) 15 MG tablet, Take  by mouth Daily. (Patient not taking: Reported on 11/21/2023), Disp: , Rfl:     metFORMIN ER (GLUCOPHAGE-XR) 500 MG 24 hr tablet, Take 1 tablet by mouth Daily. (Patient not taking: Reported on 11/21/2023), Disp: 90 tablet, Rfl: 3    mupirocin (BACTROBAN) 2 % ointment, Apply topically to the appropriate area twice daily as directed. (Patient not taking: Reported on 11/21/2023), Disp: 22 g, Rfl: 1    pantoprazole (PROTONIX) 40 MG EC tablet, Take 1 tablet by mouth Daily. (Patient not taking: Reported on 11/21/2023), Disp: 90 tablet, Rfl: 3    phenazopyridine (PYRIDIUM) 200 MG tablet, Take 1 tablet by mouth 3 (Three) Times a Day As Needed For Pain (Patient not taking: Reported on 11/21/2023), Disp: 10  "tablet, Rfl: 0    Allergies:   Allergies   Allergen Reactions    Levofloxacin Anaphylaxis    Statins Myalgia     muscle pain    Cephalexin Rash    Penicillins Rash       Objective     Physical Exam:   Vital Signs:   Vitals:    11/21/23 0855   BP: 138/90   BP Location: Left arm   Patient Position: Sitting   Cuff Size: Adult   Pulse: 83   Temp: 97.3 °F (36.3 °C)   TempSrc: Temporal   SpO2: 96%   Weight: 90.7 kg (200 lb)   Height: 165.1 cm (65\")     Body mass index is 33.28 kg/m².     Constitutional: NAD, WDWN.   Neurological: A + O x 3    Psychiatric:  Normal mood and affect    Labs:   Brief Urine Lab Results  (Last result in the past 365 days)        Color   Clarity   Blood   Leuk Est   Nitrite   Protein   CREAT   Urine HCG        11/21/23 0951 Yellow   Clear   Trace   Trace   Negative   Trace                     Radiographic Studies  No Images in the past 120 days found..    Assessment / Plan      Assessment  Sophie is a 73 y.o. female who presents with recurrent preventive UTIs.    The patient's risk factors to developing recurrent UTIs include postmenopausal, low fluid intake and constipation.  We discussed GSM and increased risk of recurrent UTIs in postmenopausal women due to dry thinning of vulva we discussed the risk of topical Estrace with patient's history of breast cancer based on recent research studies there is minimal risk for use of topical estradiol.  We discussed starting low-dose 0.5 g 2 times weekly to improve vulvar health and be preventative of UTIs we also discussed different treatment options for UTI prevention including antibiotic suppression and prevention with Hiprex.  She wishes to proceed with treatment with antibiotic suppression.    Plan  1. Encourage fluid consumption at the onset of a symptomatic UTI.  2. Various treatment strategies were discussed with the patient including antibiotics in the form of on-demand, post-coital and suppressive daily dosing.  As she is post-menopausal we also " discussed topical vaginal estrogen cream.  3.  CT Stone study and pelvic exam next visit    Follow Up:   Return in about 4 weeks (around 12/19/2023) for Follow up WILLIAM Giron, NP-C  [facility]

## 2023-11-28 ENCOUNTER — TELEPHONE (OUTPATIENT)
Dept: UROLOGY | Facility: CLINIC | Age: 73
End: 2023-11-28

## 2023-11-28 NOTE — TELEPHONE ENCOUNTER
Hub staff attempted to follow warm transfer process and was unsuccessful     Caller: DAMION    Relationship to patient: [facility]    Best call back number: [phone]    Patient is needing: WANTS TO MAKE SURE PRE-AUTH WAS TAKEN CARE OF FOR PT'S CT SCAN    WOULD LIKE A CALL BACK

## 2023-12-06 ENCOUNTER — HOSPITAL ENCOUNTER (OUTPATIENT)
Dept: CT IMAGING | Facility: HOSPITAL | Age: 73
Discharge: HOME | End: 2023-12-06
Admitting: NURSE PRACTITIONER
Payer: COMMERCIAL

## 2023-12-06 DIAGNOSIS — N39.0 RECURRENT UTI: ICD-10-CM

## 2023-12-06 PROCEDURE — 74176 CT ABD & PELVIS W/O CONTRAST: CPT

## 2023-12-08 DIAGNOSIS — N20.0 KIDNEY STONE: Primary | ICD-10-CM

## 2023-12-08 RX ORDER — TAMSULOSIN HYDROCHLORIDE 0.4 MG/1
1 CAPSULE ORAL NIGHTLY
Qty: 15 CAPSULE | Refills: 0 | Status: SHIPPED | OUTPATIENT
Start: 2023-12-08

## 2023-12-08 NOTE — PROGRESS NOTES
Called patient to discuss her CT stone protocol results. She does have a 5 mm stone non obstructing in the Left UPJ which appears more in the proximal ureter. We discussed surgical management vs trial of passage, she is not having symptoms or pain she decided to do a trial of passage for 2 weeks.    Plan:  Encouraged increased fluid intake  Start flomax 0.4 mg daily  Repeat Ct stone protocol 2 weeks

## 2024-02-20 ENCOUNTER — TRANSCRIBE ORDERS (OUTPATIENT)
Dept: ADMINISTRATIVE | Facility: HOSPITAL | Age: 74
End: 2024-02-20
Payer: COMMERCIAL

## 2024-02-20 DIAGNOSIS — C50.311 MALIGNANT NEOPLASM OF LOWER-INNER QUADRANT OF RIGHT FEMALE BREAST, UNSPECIFIED ESTROGEN RECEPTOR STATUS: Primary | ICD-10-CM

## 2024-04-01 ENCOUNTER — HOSPITAL ENCOUNTER (OUTPATIENT)
Dept: MAMMOGRAPHY | Facility: HOSPITAL | Age: 74
Discharge: HOME | End: 2024-04-01
Admitting: SURGERY
Payer: COMMERCIAL

## 2024-04-01 DIAGNOSIS — C50.311 MALIGNANT NEOPLASM OF LOWER-INNER QUADRANT OF RIGHT FEMALE BREAST, UNSPECIFIED ESTROGEN RECEPTOR STATUS: ICD-10-CM

## 2024-04-01 PROCEDURE — 77066 DX MAMMO INCL CAD BI: CPT

## 2024-04-01 PROCEDURE — G0279 TOMOSYNTHESIS, MAMMO: HCPCS

## 2024-04-01 PROCEDURE — 77066 DX MAMMO INCL CAD BI: CPT | Performed by: RADIOLOGY

## 2024-04-01 PROCEDURE — 77062 BREAST TOMOSYNTHESIS BI: CPT | Performed by: RADIOLOGY

## 2025-05-05 ENCOUNTER — TRANSCRIBE ORDERS (OUTPATIENT)
Dept: ADMINISTRATIVE | Facility: HOSPITAL | Age: 75
End: 2025-05-05
Payer: COMMERCIAL

## 2025-05-05 DIAGNOSIS — Z12.31 VISIT FOR SCREENING MAMMOGRAM: Primary | ICD-10-CM

## 2025-06-16 ENCOUNTER — HOSPITAL ENCOUNTER (EMERGENCY)
Facility: HOSPITAL | Age: 75
Discharge: HOME | End: 2025-06-16
Attending: EMERGENCY MEDICINE | Admitting: EMERGENCY MEDICINE
Payer: COMMERCIAL

## 2025-06-16 ENCOUNTER — APPOINTMENT (OUTPATIENT)
Dept: CT IMAGING | Facility: HOSPITAL | Age: 75
End: 2025-06-16
Payer: COMMERCIAL

## 2025-06-16 VITALS
RESPIRATION RATE: 16 BRPM | SYSTOLIC BLOOD PRESSURE: 119 MMHG | HEIGHT: 65 IN | WEIGHT: 206 LBS | DIASTOLIC BLOOD PRESSURE: 77 MMHG | BODY MASS INDEX: 34.32 KG/M2 | HEART RATE: 67 BPM | OXYGEN SATURATION: 96 % | TEMPERATURE: 97.9 F

## 2025-06-16 DIAGNOSIS — Z86.39 HISTORY OF DIABETES MELLITUS: ICD-10-CM

## 2025-06-16 DIAGNOSIS — Z87.442 HISTORY OF NEPHROLITHIASIS: ICD-10-CM

## 2025-06-16 DIAGNOSIS — R10.9 ACUTE RIGHT FLANK PAIN: Primary | ICD-10-CM

## 2025-06-16 LAB
ALBUMIN SERPL-MCNC: 4.1 G/DL (ref 3.5–5.2)
ALBUMIN/GLOB SERPL: 1.3 G/DL
ALP SERPL-CCNC: 139 U/L (ref 39–117)
ALT SERPL W P-5'-P-CCNC: 14 U/L (ref 1–33)
ANION GAP SERPL CALCULATED.3IONS-SCNC: 13.7 MMOL/L (ref 5–15)
AST SERPL-CCNC: 13 U/L (ref 1–32)
BASOPHILS # BLD AUTO: 0.08 10*3/MM3 (ref 0–0.2)
BASOPHILS NFR BLD AUTO: 0.8 % (ref 0–1.5)
BILIRUB SERPL-MCNC: 0.3 MG/DL (ref 0–1.2)
BILIRUB UR QL STRIP: NEGATIVE
BUN SERPL-MCNC: 17 MG/DL (ref 8–23)
BUN/CREAT SERPL: 18.1 (ref 7–25)
CALCIUM SPEC-SCNC: 9.4 MG/DL (ref 8.6–10.5)
CHLORIDE SERPL-SCNC: 105 MMOL/L (ref 98–107)
CLARITY UR: ABNORMAL
CO2 SERPL-SCNC: 22.3 MMOL/L (ref 22–29)
COLOR UR: ABNORMAL
CREAT SERPL-MCNC: 0.94 MG/DL (ref 0.57–1)
D-LACTATE SERPL-SCNC: 1.4 MMOL/L (ref 0.5–2)
DEPRECATED RDW RBC AUTO: 46 FL (ref 37–54)
EGFRCR SERPLBLD CKD-EPI 2021: 63.8 ML/MIN/1.73
EOSINOPHIL # BLD AUTO: 0.21 10*3/MM3 (ref 0–0.4)
EOSINOPHIL NFR BLD AUTO: 2 % (ref 0.3–6.2)
ERYTHROCYTE [DISTWIDTH] IN BLOOD BY AUTOMATED COUNT: 13.8 % (ref 12.3–15.4)
GLOBULIN UR ELPH-MCNC: 3.1 GM/DL
GLUCOSE SERPL-MCNC: 128 MG/DL (ref 65–99)
GLUCOSE UR STRIP-MCNC: NEGATIVE MG/DL
HCT VFR BLD AUTO: 42.2 % (ref 34–46.6)
HGB BLD-MCNC: 14.2 G/DL (ref 12–15.9)
HGB UR QL STRIP.AUTO: NEGATIVE
HOLD SPECIMEN: NORMAL
HOLD SPECIMEN: NORMAL
IMM GRANULOCYTES # BLD AUTO: 0.05 10*3/MM3 (ref 0–0.05)
IMM GRANULOCYTES NFR BLD AUTO: 0.5 % (ref 0–0.5)
KETONES UR QL STRIP: ABNORMAL
LEUKOCYTE ESTERASE UR QL STRIP.AUTO: NEGATIVE
LIPASE SERPL-CCNC: 28 U/L (ref 13–60)
LYMPHOCYTES # BLD AUTO: 2.57 10*3/MM3 (ref 0.7–3.1)
LYMPHOCYTES NFR BLD AUTO: 24.3 % (ref 19.6–45.3)
MCH RBC QN AUTO: 30.5 PG (ref 26.6–33)
MCHC RBC AUTO-ENTMCNC: 33.6 G/DL (ref 31.5–35.7)
MCV RBC AUTO: 90.6 FL (ref 79–97)
MONOCYTES # BLD AUTO: 0.65 10*3/MM3 (ref 0.1–0.9)
MONOCYTES NFR BLD AUTO: 6.1 % (ref 5–12)
NEUTROPHILS NFR BLD AUTO: 66.3 % (ref 42.7–76)
NEUTROPHILS NFR BLD AUTO: 7.01 10*3/MM3 (ref 1.7–7)
NITRITE UR QL STRIP: NEGATIVE
NRBC BLD AUTO-RTO: 0 /100 WBC (ref 0–0.2)
PH UR STRIP.AUTO: 5.5 [PH] (ref 5–8)
PLATELET # BLD AUTO: 323 10*3/MM3 (ref 140–450)
PMV BLD AUTO: 8.6 FL (ref 6–12)
POTASSIUM SERPL-SCNC: 4.3 MMOL/L (ref 3.5–5.2)
PROT SERPL-MCNC: 7.2 G/DL (ref 6–8.5)
PROT UR QL STRIP: ABNORMAL
RBC # BLD AUTO: 4.66 10*6/MM3 (ref 3.77–5.28)
SODIUM SERPL-SCNC: 141 MMOL/L (ref 136–145)
SP GR UR STRIP: 1.02 (ref 1–1.03)
UROBILINOGEN UR QL STRIP: ABNORMAL
WBC NRBC COR # BLD AUTO: 10.57 10*3/MM3 (ref 3.4–10.8)
WHOLE BLOOD HOLD COAG: NORMAL
WHOLE BLOOD HOLD SPECIMEN: NORMAL

## 2025-06-16 PROCEDURE — 85025 COMPLETE CBC W/AUTO DIFF WBC: CPT | Performed by: EMERGENCY MEDICINE

## 2025-06-16 PROCEDURE — 81003 URINALYSIS AUTO W/O SCOPE: CPT | Performed by: EMERGENCY MEDICINE

## 2025-06-16 PROCEDURE — 83605 ASSAY OF LACTIC ACID: CPT | Performed by: EMERGENCY MEDICINE

## 2025-06-16 PROCEDURE — 74176 CT ABD & PELVIS W/O CONTRAST: CPT

## 2025-06-16 PROCEDURE — 83690 ASSAY OF LIPASE: CPT | Performed by: EMERGENCY MEDICINE

## 2025-06-16 PROCEDURE — 99284 EMERGENCY DEPT VISIT MOD MDM: CPT | Performed by: EMERGENCY MEDICINE

## 2025-06-16 PROCEDURE — 80053 COMPREHEN METABOLIC PANEL: CPT | Performed by: EMERGENCY MEDICINE

## 2025-06-16 RX ORDER — SODIUM CHLORIDE 0.9 % (FLUSH) 0.9 %
10 SYRINGE (ML) INJECTION AS NEEDED
Status: DISCONTINUED | OUTPATIENT
Start: 2025-06-16 | End: 2025-06-16 | Stop reason: HOSPADM

## 2025-06-16 NOTE — ED PROVIDER NOTES
[facility]    EMERGENCY DEPARTMENT ENCOUNTER      Pt Name: Tracy Barrios  MRN: [id number]  YOB: 1950  Date of evaluation: 6/16/2025  Provider: Noble Myles MD    CHIEF COMPLAINT       Chief Complaint   Patient presents with    Flank Pain         HISTORY OF PRESENT ILLNESS   Tracy Barrios is a 74 y.o. female who presents to the emergency department with complaint of right flank pain over the course the past week.  Patient concerned due to history of kidney stones.  She says the pain is worse with movement and with pushing over the right flank.  She denies any associated chest pain or shortness of breath.  No pain in her abdomen.  She has not had any nausea, vomiting, fever, chills, cough, or urinary symptoms.  Denies any direct injury to the area.  No history of VTE.      Nursing notes were reviewed.    REVIEW OF SYSTEMS     ROS:  A chief complaint appropriate review of systems was completed and is negative except as noted in the HPI.      PAST MEDICAL HISTORY     Past Medical History:   Diagnosis Date    Arthritis     Atrial fibrillation     Breast cancer     DDD (degenerative disc disease), cervical     DDD (degenerative disc disease), thoracic     Diabetes mellitus     Dyslipidemia     GERD (gastroesophageal reflux disease)     GERD/ Hiatal Hernia    Hyperlipidemia     Hypertension     Macular degeneration     Migraine     Migraine headaches    PONV (postoperative nausea and vomiting)     SVT (supraventricular tachycardia)     Tinnitus     Wears glasses     for distance only         SURGICAL HISTORY       Past Surgical History:   Procedure Laterality Date    ABLATION OF DYSRHYTHMIC FOCUS      treatment for A-fib was successful    APPENDECTOMY      BLADDER SURGERY      bladder tack    BREAST BIOPSY Right 1990    Ultrasound guided    BREAST LUMPECTOMY Right 03/18/2022    CARDIAC CATHETERIZATION      HYSTERECTOMY  1991    Total w/BSO/complete    KNEE ARTHROSCOPY Right 10/30/2020     Procedure: Right knee diagnostic arthroscopy partial lateral meniscectomy;  Surgeon: Robert Saeed MD;  Location: [facility] OR;  Service: Orthopedics;  Laterality: Right;    OOPHORECTOMY Bilateral 1991    REDUCTION MAMMAPLASTY Bilateral 1992    TONSILLECTOMY      TOTAL KNEE ARTHROPLASTY Right 08/12/2021    Procedure: TOTAL KNEE REPLACEMENT RIGHT;  Surgeon: Lul Banks MD;  Location: [facility] OR;  Service: Orthopedics;  Laterality: Right;    VAGINAL DELIVERY      x2         CURRENT MEDICATIONS     No current facility-administered medications for this encounter.    Current Outpatient Medications:     amoxicillin (AMOXIL) 500 MG capsule, Take 1 capsule by mouth Every 8 (Eight) Hours until gone (Patient not taking: Reported on 11/21/2023), Disp: 30 capsule, Rfl: 0    amoxicillin (AMOXIL) 500 MG capsule, Take 1 capsule by mouth Every 8 (Eight) Hours until gone, Disp: 30 capsule, Rfl: 0    baclofen (LIORESAL) 10 MG tablet, Take 1 tablet by mouth 3 (Three) Times a Day. (Patient not taking: Reported on 11/21/2023), Disp: 270 tablet, Rfl: 0    baclofen (LIORESAL) 10 MG tablet, Take 1 tablet by mouth 3 (Three) Times a Day., Disp: 90 tablet, Rfl: 0    clindamycin (CLEOCIN) 150 MG capsule, Take 1 capsule by mouth 3 times a day until gone, Disp: 21 capsule, Rfl: 0    diclofenac (VOLTAREN) 75 MG EC tablet, Take 1 tablet by mouth 2 (Two) Times a Day with food, Disp: 60 tablet, Rfl: 2    Diclofenac Sodium (VOLTAREN) 1 % gel gel, Apply 4 g topically to the appropriate area as directed 4 (Four) Times a Day As Needed (Flank pain)., Disp: 100 g, Rfl: 0    docusate sodium (COLACE) 100 MG capsule, Take 1 capsule by mouth 2 (Two) Times a Day For 10 Days, Disp: 20 capsule, Rfl: 0    doxycycline (VIBRAMYCIN) 100 MG capsule, Take 1 capsule by mouth 2 (Two) Times a Day., Disp: 6 capsule, Rfl: 0    EPINEPHrine (EPIPEN) 0.3 MG/0.3ML solution auto-injector injection, Administer 1 pen injector intramuscularly single dose as needed for  anaphylaxis.  Call 911 if used (Patient taking differently: Inject 0.3 mL under the skin into the appropriate area as directed 1 (One) Time.), Disp: 2 each, Rfl: 1    estradiol (ESTRACE) 0.1 MG/GM vaginal cream, Apply 0.5 gm vaginally twice weekly at bedtime, Disp: 42.5 g, Rfl: 3    ezetimibe (ZETIA) 10 MG tablet, Take 1 tablet by mouth Every Night., Disp: 90 tablet, Rfl: 1    fluticasone (FLONASE) 50 MCG/ACT nasal spray, Administer 1 spray into the nostril(s) as directed by provider Daily., Disp: 16 g, Rfl: 1    HYDROcodone-acetaminophen (NORCO) 5-325 MG per tablet, Take 1 tablet by mouth Every 4 to 6 Hours as needed for pain, Disp: 15 tablet, Rfl: 0    lisinopril (PRINIVIL,ZESTRIL) 10 MG tablet, Take 1 tablet by mouth Daily., Disp: 90 tablet, Rfl: 2    lisinopril (PRINIVIL,ZESTRIL) 2.5 MG tablet, Take 1 tablet by mouth Daily for blood pressure (Patient not taking: Reported on 11/21/2023), Disp: 90 tablet, Rfl: 3    lisinopril (PRINIVIL,ZESTRIL) 2.5 MG tablet, Take 1 tablet by mouth Daily., Disp: 90 tablet, Rfl: 1    lisinopril (PRINIVIL,ZESTRIL) 2.5 MG tablet, Take 1 tablet by mouth Daily., Disp: 90 tablet, Rfl: 1    meloxicam (MOBIC) 15 MG tablet, Take  by mouth Daily. (Patient not taking: Reported on 11/21/2023), Disp: , Rfl:     meloxicam (MOBIC) 15 MG tablet, Take 1 tablet by mouth Daily., Disp: 90 tablet, Rfl: 1    meloxicam (MOBIC) 15 MG tablet, Take 1 tablet by mouth Daily., Disp: 90 tablet, Rfl: 0    meloxicam (MOBIC) 15 MG tablet, Take 1 tablet by mouth Daily As Needed., Disp: 90 tablet, Rfl: 2    metFORMIN ER (GLUCOPHAGE-XR) 500 MG 24 hr tablet, Take 1 tablet by mouth Daily. (Patient not taking: Reported on 11/21/2023), Disp: 90 tablet, Rfl: 3    metFORMIN ER (GLUCOPHAGE-XR) 500 MG 24 hr tablet, Take 1 tablet by mouth Daily., Disp: 90 tablet, Rfl: 1    metFORMIN ER (GLUCOPHAGE-XR) 500 MG 24 hr tablet, Take 1 tablet by mouth Daily., Disp: 90 tablet, Rfl: 0    metFORMIN ER (GLUCOPHAGE-XR) 500 MG 24 hr  tablet, Take 1 tablet by mouth Daily., Disp: 90 tablet, Rfl: 0    metFORMIN ER (GLUCOPHAGE-XR) 500 MG 24 hr tablet, Take 1 tablet by mouth Daily., Disp: 90 tablet, Rfl: 2    mupirocin (BACTROBAN) 2 % ointment, Apply topically to the appropriate area twice daily as directed. (Patient not taking: Reported on 11/21/2023), Disp: 22 g, Rfl: 1    nitrofurantoin, macrocrystal-monohydrate, (Macrobid) 100 MG capsule, Take 1 capsule by mouth Every Night., Disp: 90 capsule, Rfl: 1    nitrofurantoin, macrocrystal-monohydrate, (MACROBID) 100 MG capsule, Take 1 capsule by mouth 2 (Two) Times a Day., Disp: 14 capsule, Rfl: 0    nitrofurantoin, macrocrystal-monohydrate, (MACROBID) 100 MG capsule, Take 1 capsule by mouth 2 (two) times daily., Disp: 14 capsule, Rfl: 0    nitrofurantoin, macrocrystal-monohydrate, (Macrobid) 100 MG capsule, Take 1 capsule by mouth Daily., Disp: 90 capsule, Rfl: 2    oxyCODONE-acetaminophen (PERCOCET) 7.5-325 MG per tablet, Take 1 tablet by mouth every 6 (Six) hours as needed for Pain for up to 10 days. Max Daily Amount: 4 tablets, Disp: 30 tablet, Rfl: 0    pantoprazole (PROTONIX) 40 MG EC tablet, Take 1 tablet by mouth Daily. (Patient not taking: Reported on 11/21/2023), Disp: 90 tablet, Rfl: 3    pantoprazole (PROTONIX) 40 MG EC tablet, Take 1 tablet by mouth Daily., Disp: 90 tablet, Rfl: 2    phenazopyridine (PYRIDIUM) 200 MG tablet, Take 1 tablet by mouth 3 (Three) Times a Day As Needed For Pain (Patient not taking: Reported on 11/21/2023), Disp: 10 tablet, Rfl: 0    tamsulosin (Flomax) 0.4 MG capsule 24 hr capsule, Take 1 capsule by mouth Every Night., Disp: 15 capsule, Rfl: 0    ALLERGIES     Levofloxacin, Statins, Cephalexin, and Penicillins    FAMILY HISTORY       Family History   Problem Relation Age of Onset    Heart attack Father     Coronary artery disease Father     Liver cancer Father     Breast cancer Mother 50    Hypertension Mother     Alzheimer's disease Mother     Ovarian cancer  "Maternal Grandmother           SOCIAL HISTORY       Social History     Socioeconomic History    Marital status:    Tobacco Use    Smoking status: Never    Smokeless tobacco: Never   Vaping Use    Vaping status: Never Used   Substance and Sexual Activity    Alcohol use: No    Drug use: No    Sexual activity: Not Currently     Partners: Male     Birth control/protection: Hysterectomy         PHYSICAL EXAM    (up to 7 for level 4, 8 or more for level 5)     Vitals:    06/16/25 1300 06/16/25 1447   BP: 161/82 119/77   BP Location: Left arm    Patient Position: Sitting    Pulse: 85 67   Resp: 18 16   Temp: 97.9 °F (36.6 °C)    TempSrc: Oral    SpO2: 96% 96%   Weight: 93.4 kg (206 lb)    Height: 165.1 cm (65\")        General: Awake, alert, no acute distress.  HEENT: Conjunctivae normal.  Neck: Trachea midline.  Cardiac: Heart regular rate, rhythm, no murmurs, rubs, or gallops  Lungs: Lungs are clear to auscultation, there is no wheezing, rhonchi, or rales. There is no use of accessory muscles.  Chest wall: Moderate right-sided chest wall tenderness  Abdomen: Abdomen is soft, nontender, nondistended. There are no firm or pulsatile masses, no rebound rigidity or guarding.   Musculoskeletal: No deformity.  Neuro: Alert  Dermatology: Skin is warm and dry  Psych: Mentation is grossly normal, cognition is grossly normal. Affect is appropriate.        DIAGNOSTIC RESULTS     EKG: All EKGs are interpreted by the Emergency Department Physician who either signs or Co-signs this chart in the absence of a cardiologist.    No orders to display         RADIOLOGY:   [x] Radiologist's Report Reviewed:  CT Abdomen Pelvis Without Contrast   Final Result   1. No obstructing stone disease or hydronephrosis.   2. Stable left renal mass most compatible with a benign cyst.       This study was performed with techniques to keep radiation doses as low   as reasonably achievable (ALARA). Individualized dose reduction   techniques using " automated exposure control or adjustment of vA and/or   kV according to the patient size were employed.        This report was signed and finalized on 6/16/2025 2:36 PM by Brigido Villanueva MD.              I ordered and independently reviewed the above noted radiographic studies.        LABS:    I have reviewed and interpreted all of the currently available lab results from this visit (if applicable):  Results for orders placed or performed during the hospital encounter of 06/16/25   Comprehensive Metabolic Panel    Collection Time: 06/16/25  1:13 PM    Specimen: Blood   Result Value Ref Range    Glucose 128 (H) 65 - 99 mg/dL    BUN 17.0 8.0 - 23.0 mg/dL    Creatinine 0.94 0.57 - 1.00 mg/dL    Sodium 141 136 - 145 mmol/L    Potassium 4.3 3.5 - 5.2 mmol/L    Chloride 105 98 - 107 mmol/L    CO2 22.3 22.0 - 29.0 mmol/L    Calcium 9.4 8.6 - 10.5 mg/dL    Total Protein 7.2 6.0 - 8.5 g/dL    Albumin 4.1 3.5 - 5.2 g/dL    ALT (SGPT) 14 1 - 33 U/L    AST (SGOT) 13 1 - 32 U/L    Alkaline Phosphatase 139 (H) 39 - 117 U/L    Total Bilirubin 0.3 0.0 - 1.2 mg/dL    Globulin 3.1 gm/dL    A/G Ratio 1.3 g/dL    BUN/Creatinine Ratio 18.1 7.0 - 25.0    Anion Gap 13.7 5.0 - 15.0 mmol/L    eGFR 63.8 >60.0 mL/min/1.73   Lipase    Collection Time: 06/16/25  1:13 PM    Specimen: Blood   Result Value Ref Range    Lipase 28 13 - 60 U/L   Lactic Acid, Plasma    Collection Time: 06/16/25  1:13 PM    Specimen: Blood   Result Value Ref Range    Lactate 1.4 0.5 - 2.0 mmol/L   CBC Auto Differential    Collection Time: 06/16/25  1:13 PM    Specimen: Blood   Result Value Ref Range    WBC 10.57 3.40 - 10.80 10*3/mm3    RBC 4.66 3.77 - 5.28 10*6/mm3    Hemoglobin 14.2 12.0 - 15.9 g/dL    Hematocrit 42.2 34.0 - 46.6 %    MCV 90.6 79.0 - 97.0 fL    MCH 30.5 26.6 - 33.0 pg    MCHC 33.6 31.5 - 35.7 g/dL    RDW 13.8 12.3 - 15.4 %    RDW-SD 46.0 37.0 - 54.0 fl    MPV 8.6 6.0 - 12.0 fL    Platelets 323 140 - 450 10*3/mm3    Neutrophil % 66.3 42.7 - 76.0 %     Lymphocyte % 24.3 19.6 - 45.3 %    Monocyte % 6.1 5.0 - 12.0 %    Eosinophil % 2.0 0.3 - 6.2 %    Basophil % 0.8 0.0 - 1.5 %    Immature Grans % 0.5 0.0 - 0.5 %    Neutrophils, Absolute 7.01 (H) 1.70 - 7.00 10*3/mm3    Lymphocytes, Absolute 2.57 0.70 - 3.10 10*3/mm3    Monocytes, Absolute 0.65 0.10 - 0.90 10*3/mm3    Eosinophils, Absolute 0.21 0.00 - 0.40 10*3/mm3    Basophils, Absolute 0.08 0.00 - 0.20 10*3/mm3    Immature Grans, Absolute 0.05 0.00 - 0.05 10*3/mm3    nRBC 0.0 0.0 - 0.2 /100 WBC   Green Top (Gel)    Collection Time: 06/16/25  1:13 PM   Result Value Ref Range    Extra Tube Hold for add-ons.    Lavender Top    Collection Time: 06/16/25  1:13 PM   Result Value Ref Range    Extra Tube hold for add-on    Gold Top - SST    Collection Time: 06/16/25  1:13 PM   Result Value Ref Range    Extra Tube Hold for add-ons.    Light Blue Top    Collection Time: 06/16/25  1:13 PM   Result Value Ref Range    Extra Tube Hold for add-ons.    Urinalysis With Microscopic If Indicated (No Culture) - Urine, Clean Catch    Collection Time: 06/16/25  1:34 PM    Specimen: Urine, Clean Catch   Result Value Ref Range    Color, UA Dark Yellow (A) Yellow, Straw    Appearance, UA Cloudy (A) Clear    pH, UA 5.5 5.0 - 8.0    Specific Gravity, UA 1.024 1.005 - 1.030    Glucose, UA Negative Negative    Ketones, UA Trace (A) Negative    Bilirubin, UA Negative Negative    Blood, UA Negative Negative    Protein, UA Trace (A) Negative    Leuk Esterase, UA Negative Negative    Nitrite, UA Negative Negative    Urobilinogen, UA 1.0 E.U./dL 0.2 - 1.0 E.U./dL        If labs were ordered, I independently reviewed the results and considered them in treating the patient.      EMERGENCY DEPARTMENT COURSE and DIFFERENTIAL DIAGNOSIS/MDM:   Vitals:  AS OF 22:46 EDT    BP - 119/77  HR - 67  TEMP - 97.9 °F (36.6 °C) (Oral)  O2 SATS - 96%        Discussion below represents my analysis of pertinent findings related to patient's condition, differential  diagnosis, treatment plan and final disposition.      Differential diagnosis:  The differential diagnosis associated with the patient's presentation includes: Biliary pathology, pancreatitis, ureteral stone, urinary tract infection, bowel obstruction, pulmonary embolism      Independent interpretations (ECG/rhythm strip/X-ray/US/CT scan): I independently interpreted the patient's abdominal CT and cardiac monitor.  No evidence of ureteral stone and patient is in sinus rhythm.      Patient's care impacted by:   [] Diabetes   [] Hypertension   [] Coronary Artery Disease   [] Cancer   [x] Other: History of kidney stones    Care significantly affected by Social Determinants of Health (housing and economic circumstances, unemployment)    [] Yes     [x] No   If yes, Patient's care significantly limited by  Social Determinants of Health including:    [] Inadequate housing    [] Low income    [] Alcoholism and drug addiction in family    [] Problems related to primary support group    [] Unemployment    [] Problems related to employment    [] Other Social Determinants of Health:       I considered prescription management with:    [x] Pain medication: I offered IV morphine for pain management, however the patient declined   [] Antiviral:   [] Antibiotic:   [] Other:    Additional orders considered but not ordered:  The following testing was considered but ultimately not selected after discussion with patient/family: I considered PE and therefore CTA of the chest, however for the reasons outlined in the emergency department course this was not pursued.    ED Course:    ED Course as of 06/16/25 2246   Mon Jun 16, 2025   1422 On reexamination, the patient remains resting comfortably in bed and is in no distress.  Vital signs are within normal limits.  High degree of suspicion for musculoskeletal etiology of the patient's pain.  She has direct focal tenderness that exactly reproduces the pain with very superficial palpation of  the right flank.  There are no skin changes overlying this area to suggest shingles.  The abdomen is nontender and CT scan shows no evidence of intra-abdominal process such as biliary pathology, pancreatitis, or ureteral stone.  Labs unremarkable and showed no evidence of significant leukocytosis, hepatic or pancreatic inflammation, or urinary tract infection.  Also considered PE, however again this pain is reproducible, she has no shortness of breath, is not tachycardic or hypoxic and so felt that an atypical PE presentation was unlikely.  She stable for discharge home with conservative management. [NS]      ED Course User Index  [NS] Noble Myles MD           I had a discussion with the patient/family regarding diagnosis, diagnostic results, treatment plan, and medications.  The patient/family indicated understanding of these instructions.  I spent adequate time at the bedside preceding discharge necessary to personally discuss the aftercare instructions, giving patient education, providing explanations of the results of our evaluations/findings, and my decision making to assure that the patient/family understand the plan of care.  Time was allotted to answer questions at that time and throughout the ED course.  Emphasis was placed on timely follow-up after discharge.  I also discussed the potential for the development of an acute emergent condition requiring further evaluation, admission, or even surgical intervention. I discussed that we found nothing during the visit today indicating the need for further workup, admission, or the presence of an unstable medical condition.  I encouraged the patient to return to the emergency department immediately for ANY concerns, worsening, new complaints, or if symptoms persist and unable to seek follow-up in a timely fashion.  The patient/family expressed understanding and agreement with this plan.  The patient will follow-up with their PCP in 1-2 days for  reevaluation.       FINAL IMPRESSION      1. Acute right flank pain    2. History of nephrolithiasis    3. History of diabetes mellitus          DISPOSITION/PLAN     ED Disposition       ED Disposition   Discharge    Condition   Stable    Comment   --                 Comment: Please note this report has been produced using speech recognition software.      Noble Myles MD  Attending Emergency Physician             Noble Myles MD  06/16/25 1281

## 2025-08-27 ENCOUNTER — HOSPITAL ENCOUNTER (OUTPATIENT)
Dept: MAMMOGRAPHY | Facility: HOSPITAL | Age: 75
Discharge: HOME | End: 2025-08-27
Admitting: STUDENT IN AN ORGANIZED HEALTH CARE EDUCATION/TRAINING PROGRAM
Payer: COMMERCIAL

## 2025-08-27 DIAGNOSIS — Z12.31 VISIT FOR SCREENING MAMMOGRAM: ICD-10-CM

## 2025-08-27 PROCEDURE — 77067 SCR MAMMO BI INCL CAD: CPT

## 2025-08-27 PROCEDURE — 77063 BREAST TOMOSYNTHESIS BI: CPT

## (undated) DEVICE — BNDG ELAS W/CLIP 6IN 10YD LF STRL

## (undated) DEVICE — 3 BONE CEMENT MIXER: Brand: MIXEVAC

## (undated) DEVICE — NDL SPINE 20G 3 1/2 YEL STRL 1P/U

## (undated) DEVICE — SUT ETHLN 3-0 FS118IN 663H

## (undated) DEVICE — STERILE PVP: Brand: MEDLINE INDUSTRIES, INC.

## (undated) DEVICE — PK KN TOTL 10

## (undated) DEVICE — 3.5 MM FULL RADIUS STRAIGHT                                    BLADES, POWER/EP-1, BEIGE, PACKAGED                                    6 PER BOX, STERILE

## (undated) DEVICE — EMERALD ARTHROSCOPY SHEET: Brand: CONVERTORS

## (undated) DEVICE — PATIENT RETURN ELECTRODE, SINGLE-USE, CONTACT QUALITY MONITORING, ADULT, WITH 9FT CORD, FOR PATIENTS WEIGING OVER 33LBS. (15KG): Brand: MEGADYNE

## (undated) DEVICE — GLV SURG SENSICARE SLT PF LF 7.5 STRL

## (undated) DEVICE — ANTIBACTERIAL UNDYED BRAIDED (POLYGLACTIN 910), SYNTHETIC ABSORBABLE SUTURE: Brand: COATED VICRYL

## (undated) DEVICE — DYONICS 25 PATIENT TUBE SET MUST                                    BE USED WITH 7211007, 12 PER BOX

## (undated) DEVICE — STRYKER PERFORMANCE SERIES SAGITTAL BLADE: Brand: STRYKER PERFORMANCE SERIES

## (undated) DEVICE — DRSNG PAD ABD 8X10IN STRL

## (undated) DEVICE — GLV SURG TRIUMPH PF LTX 8 STRL

## (undated) DEVICE — APPL CHLORAPREP W/TINT 10.5ML ORNG BX25

## (undated) DEVICE — PK KN ARTHSCP 20

## (undated) DEVICE — POSTN SURG LEG WELL LOW EXTREM 1P/U

## (undated) DEVICE — GLV SURG PREMIERPRO MIC LTX PF SZ8 BRN

## (undated) DEVICE — UNDERCAST PADDING: Brand: DEROYAL

## (undated) DEVICE — TRY EPID SFTY 18G 3.5IN 1T7680

## (undated) DEVICE — RIMMED SPEED PIN 30MM STERILE

## (undated) DEVICE — Device

## (undated) DEVICE — SYS CLS SKIN PREMIERPRO EXOFINFUSION 22CM

## (undated) DEVICE — NDL HYPO ECLPS SFTY 18G 1 1/2IN

## (undated) DEVICE — BLANKT WARM UPPR/BDY ARM/OUT 57X196CM

## (undated) DEVICE — SPNG GZ WOVN 4X4IN 12PLY 10/BX STRL

## (undated) DEVICE — PUMP PAIN AUTOFUSER AUTO SELCT NOBOLUS 1TO14ML/HR 550ML DISP

## (undated) DEVICE — ELECTRD BLD EZ CLN STD 2.5IN

## (undated) DEVICE — PAD,ABDOMINAL,5"X9",STERILE,LF,1/PK: Brand: MEDLINE INDUSTRIES, INC.

## (undated) DEVICE — PAD ARMBRD SURG CONVOL 7.5X20X2IN

## (undated) DEVICE — PENCL ROCKRSWCH MEGADYNE W/HOLSTR 10FT SS

## (undated) DEVICE — DRSNG WND GZ CURAD OIL EMULSION 3X3IN STRL